# Patient Record
Sex: MALE | Race: WHITE | NOT HISPANIC OR LATINO | Employment: FULL TIME | URBAN - METROPOLITAN AREA
[De-identification: names, ages, dates, MRNs, and addresses within clinical notes are randomized per-mention and may not be internally consistent; named-entity substitution may affect disease eponyms.]

---

## 2017-01-16 DIAGNOSIS — L30.9 DERMATITIS: ICD-10-CM

## 2017-01-16 DIAGNOSIS — L30.4 INTERTRIGO: ICD-10-CM

## 2017-01-16 DIAGNOSIS — L50.9 URTICARIA: ICD-10-CM

## 2017-01-16 DIAGNOSIS — L29.9 ITCHING: Primary | ICD-10-CM

## 2017-01-16 NOTE — TELEPHONE ENCOUNTER
"Miesha-    Spoke to patient as per 11-9-16 dictation, he was supposed to call us in 1 week with an update.     \"I am not having itching at all. I tired going from 2 pills a day, to 1 pill a day and I start breaking out under the arms again..\"     \"I use the Lidex ointment when it breaks out, it clears in a couple days..\"     \"I plan on coming in for a Patch test when I get laid off in a week or two..\"     Please advise. Vania Williamson RN    "

## 2017-01-17 RX ORDER — HYDROXYZINE HYDROCHLORIDE 25 MG/1
25-50 TABLET, FILM COATED ORAL AT BEDTIME
Qty: 60 TABLET | Refills: 1 | Status: SHIPPED | OUTPATIENT
Start: 2017-01-17 | End: 2017-02-16

## 2017-02-14 ENCOUNTER — OFFICE VISIT (OUTPATIENT)
Dept: DERMATOLOGY | Facility: CLINIC | Age: 47
End: 2017-02-14
Payer: COMMERCIAL

## 2017-02-14 VITALS — HEART RATE: 59 BPM | SYSTOLIC BLOOD PRESSURE: 126 MMHG | OXYGEN SATURATION: 99 % | DIASTOLIC BLOOD PRESSURE: 74 MMHG

## 2017-02-14 DIAGNOSIS — L30.9 DERMATITIS: Primary | ICD-10-CM

## 2017-02-14 PROCEDURE — 95044 PATCH/APPLICATION TESTS: CPT | Performed by: PHYSICIAN ASSISTANT

## 2017-02-14 NOTE — MR AVS SNAPSHOT
"              After Visit Summary   2/14/2017    Leobardo Pace    MRN: [id number]           Patient Information     Date Of Birth          1970        Visit Information        Provider Department      2/14/2017 9:20 AM Miesha Callaway PA-C [facility]        Today's Diagnoses     Dermatitis    -  1       Follow-ups after your visit        Your next 10 appointments already scheduled     Feb 16, 2017  9:40 AM CST   Return Visit with Miesha Callaway PA-C   [facility]    [address] [address]   [phone]            Feb 17, 2017 10:20 AM CST   Return Visit with Miesha Callaway PA-C   [facility] ([facility]    [address] [address]   [phone]              Who to contact     If you have questions or need follow up information about today's clinic visit or your schedule please contact [facility] directly at [phone].  Normal or non-critical lab and imaging results will be communicated to you by [employer], letter or phone within 4 business days after the clinic has received the results. If you do not hear from us within 7 days, please contact the clinic through [employer] or phone. If you have a critical or abnormal lab result, we will notify you by phone as soon as possible.  Submit refill requests through [employer] or call your pharmacy and they will forward the refill request to us. Please allow 3 business days for your refill to be completed.          Additional Information About Your Visit        [employer] Information     [employer] lets you send messages to your doctor, view your test results, renew your prescriptions, schedule appointments and more. To sign up, go to [email or web address] . Click on \"Log in\" on the left side of the screen, which will take you to the Welcome page. Then click on \"Sign up Now\" on the right side of the page.     You " will be asked to enter the access code listed below, as well as some personal information. Please follow the directions to create your username and password.     Your access code is: [email or web address]  Expires: 5/15/2017  9:58 AM     Your access code will  in 90 days. If you need help or a new code, please call your [facility] or [phone].        Care EveryWhere ID     This is your Care EveryWhere ID. This could be used by other organizations to access your [place] medical records  [id number]        Your Vitals Were     Pulse Pulse Oximetry                59 99%           Blood Pressure from Last 3 Encounters:   17 126/74   16 140/73   10/28/16 149/82    Weight from Last 3 Encounters:   12 103.6 kg (228 lb 6.4 oz)   12 105.4 kg (232 lb 6.4 oz)              We Performed the Following     PATCH TESTS, EACH        Primary Care Provider    Mason Arthur MD       No address on file        Thank you!     Thank you for choosing [facility]  for your care. Our goal is always to provide you with excellent care. Hearing back from our patients is one way we can continue to improve our services. Please take a few minutes to complete the written survey that you may receive in the mail after your visit with us. Thank you!             Your Updated Medication List - Protect others around you: Learn how to safely use, store and throw away your medicines at www.disposemymeds.org.          This list is accurate as of: 17  9:58 AM.  Always use your most recent med list.                   Brand Name Dispense Instructions for use    BENADRYL cream   Generic drug:  diphenhydrAMINE-zinc acetate      Apply  topically 3 times daily as needed.       fluocinonide 0.05 % ointment    LIDEX    60 g    Apply sparingly to affected area twice daily as needed.  Do not apply to face.       hydrOXYzine 25 MG tablet    ATARAX    60 tablet    Take 1-2 tablets (25-50 mg) by mouth At Bedtime

## 2017-02-14 NOTE — PROGRESS NOTES
Leobardo Pace is a 47 year old year old male patient here today for true testing. Patient reports that he still has recurrent rash on his axilla. He is still taking zyrtec and hydroxyzine and using the topical steroid as needed. Patient has no other skin complaints today.  Remainder of the HPI, Meds, PMH, Allergies, FH, and SH was reviewed in chart.  History reviewed. No pertinent past medical history.    History reviewed. No pertinent past surgical history.     History reviewed. No pertinent family history.    Social History     Social History     Marital status:      Spouse name: N/A     Number of children: N/A     Years of education: N/A     Occupational History     Not on file.     Social History Main Topics     Smoking status: Never Smoker     Smokeless tobacco: Never Used     Alcohol use Not on file     Drug use: Not on file     Sexual activity: Not on file     Other Topics Concern     Not on file     Social History Narrative       Outpatient Encounter Prescriptions as of 2/14/2017   Medication Sig Dispense Refill     hydrOXYzine (ATARAX) 25 MG tablet Take 1-2 tablets (25-50 mg) by mouth At Bedtime 60 tablet 1     fluocinonide (LIDEX) 0.05 % ointment Apply sparingly to affected area twice daily as needed.  Do not apply to face. 60 g 0     diphenhydrAMINE-zinc acetate (BENADRYL) cream Apply  topically 3 times daily as needed.       No facility-administered encounter medications on file as of 2/14/2017.              Review Of Systems  Skin: As above  Eyes: negative  Ears/Nose/Throat: negative  Respiratory: No shortness of breath, dyspnea on exertion, cough, or hemoptysis  Cardiovascular: negative  Gastrointestinal: negative  Genitourinary: negative  Musculoskeletal: negative  Neurologic: negative  Psychiatric: negative  Hematologic/Lymphatic/Immunologic: negative  Endocrine: negative      O:   NAD, WDWN, Alert & Oriented, Mood & Affect wnl, Vitals stable   Here today alone   /74  Pulse 59  SpO2  99%   General appearance normal   Vitals stable   Alert, oriented and in no acute distress      Patches were applied to upper back today      Eyes: Conjunctivae/lids:Normal     MSK:Normal    Pulm: Breathing Normal    Neuro/Psych: Orientation:Normal; Mood/Affect:Normal  A/P:  1. Recurrent Dermatitis with eos.   PATCH TESTING- 36 allergens are tested, including negative control  Your skin condition may be caused by an allergic response to chemicals you come in contact with. The method to obtain proof of a possible contact dermatitis is to apply patch tests. Sticky patches will be applied to your back that contains different common chemicals found in household end work environments. A positive test reaction will look like a red patch and is sometimes itchy.  PATCH TEST PROCEDURES  Patch Testing requires 3 office visits in one week. Test allergens will be taped to your back on (1st visit). The patches will be removed in 48 hours (2nd visit) and you will need to return the next day for the doctor to do the final reading (3rd visit). On rare occasions a fourth check may be needed at 6 days.  Final Visit: If you have positive reactions, practitioner will prepare information handouts on the chemicals you are sensitive to and also generate a list of safe products to use.  PATCH TEST INSTRUCTIONS  1. The test will be applied to the upper butt area. If these areas are hairy they will need to be shaved prior to application.  2. Please notify us if you ere pregnant.   3. To prevent excessive sweating please refrain from exercising, performing hard work, taking a shower or hot bath. You may take e tepid bath. Do not get the patch test area wet.   4. Do not take any cortisone medicine during the test. Call if you have any questions about your medications. You must be off oral cortisone (prednisone) for at least 2 weeks prior to this test.   5. Avoid exposure of the back to the sun.   6. If any patch burns and/or itches severely  call the office for further instructions.   7. Do not remove the patch.   8. Do not rub or scratch the application zone. Itching is an indication of a positive response and scratching might alter the test result.   PLEASE BRING THE FOLLOWING ITEMS ON YOUR FIRST AND LAST VISIT  Please bring with you the following: topical medicines, eye drops, cosmetics, moisturizing creams, after shaves, perfumes, colognes, sunscreens, insect repellents or other products you or your spouse apply to the skin. If you have hand dermatitis please bring any gloves that you wear. Bring these items with you even if you have not used them recently or even if you do not think you have any problems with the item.  If you have any questions contact our office   Tues (Patches applied)  Thur(Patches removed 48 hr. reading)  Fri (72 hour reading by doctor results discussed)

## 2017-02-14 NOTE — NURSING NOTE
Initial /74  Pulse 59  SpO2 99% Estimated body mass index is 30.98 kg/(m^2) as calculated from the following:    Height as of 8/13/12: 1.829 m (6').    Weight as of 8/13/12: 103.6 kg (228 lb 6.4 oz). .

## 2017-02-16 ENCOUNTER — OFFICE VISIT (OUTPATIENT)
Dept: DERMATOLOGY | Facility: CLINIC | Age: 47
End: 2017-02-16
Payer: COMMERCIAL

## 2017-02-16 DIAGNOSIS — L29.9 ITCHING: ICD-10-CM

## 2017-02-16 PROCEDURE — 99024 POSTOP FOLLOW-UP VISIT: CPT | Performed by: PHYSICIAN ASSISTANT

## 2017-02-16 RX ORDER — HYDROXYZINE HYDROCHLORIDE 25 MG/1
25-50 TABLET, FILM COATED ORAL AT BEDTIME
Qty: 60 TABLET | Refills: 2 | Status: SHIPPED | OUTPATIENT
Start: 2017-02-16 | End: 2018-03-29

## 2017-02-16 NOTE — MR AVS SNAPSHOT
"              After Visit Summary   2/16/2017    Leobardo Pace    MRN: [id number]           Patient Information     Date Of Birth          1970        Visit Information        Provider Department      2/16/2017 9:40 AM Miesha Callaway PA-C [facility]        Today's Diagnoses     Itching           Follow-ups after your visit        Your next 10 appointments already scheduled     Feb 17, 2017 10:20 AM CST   Return Visit with Miesha Callaway PA-C   [facility]    [address] [address] [phone]              Who to contact     If you have questions or need follow up information about today's clinic visit or your schedule please contact [facility] directly at [phone].  Normal or non-critical lab and imaging results will be communicated to you by [employer], letter or phone within 4 business days after the clinic has received the results. If you do not hear from us within 7 days, please contact the clinic through MyChart or phone. If you have a critical or abnormal lab result, we will notify you by phone as soon as possible.  Submit refill requests through [email or web address] or call your pharmacy and they will forward the refill request to us. Please allow 3 business days for your refill to be completed.          Additional Information About Your Visit        MyChart Information     [email or web address] lets you send messages to your doctor, view your test results, renew your prescriptions, schedule appointments and more. To sign up, go to [email or web address] . Click on \"Log in\" on the left side of the screen, which will take you to the Welcome page. Then click on \"Sign up Now\" on the right side of the page.     You will be asked to enter the access code listed below, as well as some personal information. Please follow the directions to create your username and password.     Your access code is: [email or web address]  Expires: " 5/15/2017  9:58 AM     Your access code will  in 90 days. If you need help or a new code, please call your [place] clinic or [phone].        Care EveryWhere ID     This is your Care EveryWhere ID. This could be used by other organizations to access your [place] medical records  [id number]         Blood Pressure from Last 3 Encounters:   17 126/74   16 140/73   10/28/16 149/82    Weight from Last 3 Encounters:   12 103.6 kg (228 lb 6.4 oz)   12 105.4 kg (232 lb 6.4 oz)              Today, you had the following     No orders found for display         Where to get your medicines      These medications were sent to [facility] [address] [place] [address]  [address] [address]     Phone:  [phone]     hydrOXYzine 25 MG tablet          Primary Care Provider    None Doctor, MD       No address on file        Thank you!     Thank you for choosing [facility]  for your care. Our goal is always to provide you with excellent care. Hearing back from our patients is one way we can continue to improve our services. Please take a few minutes to complete the written survey that you may receive in the mail after your visit with us. Thank you!             Your Updated Medication List - Protect others around you: Learn how to safely use, store and throw away your medicines at www.disposemymeds.org.          This list is accurate as of: 17 12:36 PM.  Always use your most recent med list.                   Brand Name Dispense Instructions for use    BENADRYL cream   Generic drug:  diphenhydrAMINE-zinc acetate      Apply  topically 3 times daily as needed.       fluocinonide 0.05 % ointment    LIDEX    60 g    Apply sparingly to affected area twice daily as needed.  Do not apply to face.       hydrOXYzine 25 MG tablet    ATARAX    60 tablet    Take 1-2 tablets (25-50 mg) by mouth At Bedtime

## 2017-02-16 NOTE — PROGRESS NOTES
Patient is here today for 48 hours true test reading.   Patches were removed today.  Mildly positive for fragrance mix and gold.   Continue to keep skin dry for next 24 hours.   Recheck in 24 hours.

## 2017-02-17 ENCOUNTER — OFFICE VISIT (OUTPATIENT)
Dept: DERMATOLOGY | Facility: CLINIC | Age: 47
End: 2017-02-17
Payer: COMMERCIAL

## 2017-02-17 VITALS — SYSTOLIC BLOOD PRESSURE: 122 MMHG | DIASTOLIC BLOOD PRESSURE: 72 MMHG | OXYGEN SATURATION: 99 % | HEART RATE: 64 BPM

## 2017-02-17 DIAGNOSIS — L23.89 ALLERGIC CONTACT DERMATITIS DUE TO OTHER AGENTS: Primary | ICD-10-CM

## 2017-02-17 PROCEDURE — 99213 OFFICE O/P EST LOW 20 MIN: CPT | Performed by: PHYSICIAN ASSISTANT

## 2017-02-17 NOTE — MR AVS SNAPSHOT
"              After Visit Summary   2017    Leobardo Pace    MRN: [id number]           Patient Information     Date Of Birth          1970        Visit Information        Provider Department      2017 10:20 AM Miesha Callaway PA-C [facility]        Today's Diagnoses     Allergic contact dermatitis due to other agents    -  1       Follow-ups after your visit        Who to contact     If you have questions or need follow up information about today's clinic visit or your schedule please contact [facility] directly at [phone].  Normal or non-critical lab and imaging results will be communicated to you by [employer], letter or phone within 4 business days after the clinic has received the results. If you do not hear from us within 7 days, please contact the clinic through [employer] or phone. If you have a critical or abnormal lab result, we will notify you by phone as soon as possible.  Submit refill requests through [employer] or call your pharmacy and they will forward the refill request to us. Please allow 3 business days for your refill to be completed.          Additional Information About Your Visit        MyChart Information     [employer] lets you send messages to your doctor, view your test results, renew your prescriptions, schedule appointments and more. To sign up, go to [email or web address] . Click on \"Log in\" on the left side of the screen, which will take you to the Welcome page. Then click on \"Sign up Now\" on the right side of the page.     You will be asked to enter the access code listed below, as well as some personal information. Please follow the directions to create your username and password.     Your access code is: [email or web address]  Expires: 5/15/2017  9:58 AM     Your access code will  in 90 days. If you need help or a new code, please call your [facility] or [phone].        Care EveryWhere ID     This is your Care EveryWhere ID. This " could be used by other organizations to access your [place] medical records  [id number]        Your Vitals Were     Pulse Pulse Oximetry                64 99%           Blood Pressure from Last 3 Encounters:   02/17/17 122/72   02/14/17 126/74   11/09/16 140/73    Weight from Last 3 Encounters:   08/13/12 103.6 kg (228 lb 6.4 oz)   08/02/12 105.4 kg (232 lb 6.4 oz)              Today, you had the following     No orders found for display       Primary Care Provider    Mason Arthur MD       No address on file        Thank you!     Thank you for choosing [facility]  for your care. Our goal is always to provide you with excellent care. Hearing back from our patients is one way we can continue to improve our services. Please take a few minutes to complete the written survey that you may receive in the mail after your visit with us. Thank you!             Your Updated Medication List - Protect others around you: Learn how to safely use, store and throw away your medicines at www.disposemymeds.org.          This list is accurate as of: 2/17/17  1:11 PM.  Always use your most recent med list.                   Brand Name Dispense Instructions for use    BENADRYL cream   Generic drug:  diphenhydrAMINE-zinc acetate      Apply  topically 3 times daily as needed.       fluocinonide 0.05 % ointment    LIDEX    60 g    Apply sparingly to affected area twice daily as needed.  Do not apply to face.       hydrOXYzine 25 MG tablet    ATARAX    60 tablet    Take 1-2 tablets (25-50 mg) by mouth At Bedtime

## 2017-02-17 NOTE — NURSING NOTE
Initial /72 (BP Location: Left arm, Patient Position: Chair, Cuff Size: Adult Large)  Pulse 64  SpO2 99% Estimated body mass index is 30.98 kg/(m^2) as calculated from the following:    Height as of 8/13/12: 1.829 m (6').    Weight as of 8/13/12: 103.6 kg (228 lb 6.4 oz). .    Dafne Dickens LPN

## 2017-02-17 NOTE — PROGRESS NOTES
Leobardo Pace is a 47 year old year old male patient here today for recheck true test.  Patient is here for 72 hour true test reading.  Remainder of the HPI, Meds, PMH, Allergies, FH, and SH was reviewed in chart.  History reviewed. No pertinent past medical history.    History reviewed. No pertinent past surgical history.     History reviewed. No pertinent family history.    Social History     Social History     Marital status:      Spouse name: N/A     Number of children: N/A     Years of education: N/A     Occupational History     Not on file.     Social History Main Topics     Smoking status: Never Smoker     Smokeless tobacco: Never Used     Alcohol use Not on file     Drug use: Not on file     Sexual activity: Not on file     Other Topics Concern     Not on file     Social History Narrative       Outpatient Encounter Prescriptions as of 2/17/2017   Medication Sig Dispense Refill     hydrOXYzine (ATARAX) 25 MG tablet Take 1-2 tablets (25-50 mg) by mouth At Bedtime 60 tablet 2     fluocinonide (LIDEX) 0.05 % ointment Apply sparingly to affected area twice daily as needed.  Do not apply to face. 60 g 0     diphenhydrAMINE-zinc acetate (BENADRYL) cream Apply  topically 3 times daily as needed.       No facility-administered encounter medications on file as of 2/17/2017.              Review Of Systems  Skin: As above  Eyes: negative  Ears/Nose/Throat: negative  Respiratory: No shortness of breath, dyspnea on exertion, cough, or hemoptysis  Cardiovascular: negative  Gastrointestinal: negative  Genitourinary: negative  Musculoskeletal: negative  Neurologic: negative  Psychiatric: negative  Hematologic/Lymphatic/Immunologic: negative  Endocrine: negative      O:   NAD, WDWN, Alert & Oriented, Mood & Affect wnl, Vitals stable   Here today alone   /72 (BP Location: Left arm, Patient Position: Chair, Cuff Size: Adult Large)  Pulse 64  SpO2 99%   General appearance normal   Vitals stable   Alert, oriented and  in no acute distress      Box for fragrance mix has mild erythema with papules      Eyes: Conjunctivae/lids:Normal     ENT: Lips    MSK:Normal    Pulm: Breathing Normal    Neuro/Psych: Orientation:Normal; Mood/Affect:Normal  A/P:  1. Weak positive reaction to fragrance mix   Discussed allergy, allergen information was printed out for patient.   Also printed information of safe products from the [employer] that do not contain allergen.   Discussed avoidance.   Continue antihistamines and topical steroid as needed.   Recheck in 3-6 months if not improving. Skin care regimen reviewed with patient: Eliminate harsh soaps, i.e. Dial, zest, irsih spring; Mild soaps such as Cetaphil or Dove sensitive skin, avoid hot or cold showers, aggressive use of emollients including vanicream, cetaphil or cerave discussed with patient.

## 2018-03-29 ENCOUNTER — OFFICE VISIT (OUTPATIENT)
Dept: DERMATOLOGY | Facility: CLINIC | Age: 48
End: 2018-03-29
Payer: COMMERCIAL

## 2018-03-29 VITALS
DIASTOLIC BLOOD PRESSURE: 71 MMHG | OXYGEN SATURATION: 99 % | HEART RATE: 56 BPM | RESPIRATION RATE: 16 BRPM | SYSTOLIC BLOOD PRESSURE: 121 MMHG

## 2018-03-29 DIAGNOSIS — L29.9 ITCHING: ICD-10-CM

## 2018-03-29 DIAGNOSIS — L30.9 DERMATITIS: ICD-10-CM

## 2018-03-29 PROCEDURE — 99213 OFFICE O/P EST LOW 20 MIN: CPT | Performed by: PHYSICIAN ASSISTANT

## 2018-03-29 RX ORDER — FLUOCINONIDE 0.5 MG/G
OINTMENT TOPICAL
Qty: 60 G | Refills: 1 | Status: SHIPPED | OUTPATIENT
Start: 2018-03-29 | End: 2019-11-18

## 2018-03-29 RX ORDER — HYDROXYZINE HYDROCHLORIDE 25 MG/1
25-50 TABLET, FILM COATED ORAL AT BEDTIME
Qty: 60 TABLET | Refills: 2 | Status: SHIPPED | OUTPATIENT
Start: 2018-03-29 | End: 2019-02-25

## 2018-03-29 NOTE — LETTER
3/29/2018         RE: Leobardo Pace  [address] [place] [address]        Dear Colleague,    Thank you for referring your patient, Leobardo Pace, to the [facility]. Please see a copy of my visit note below.    Leobrado Pace is a 48 year old year old male patient here today for recheck rash on arms. He reports that he will intermittently get rash on arms. His true test was positive for fragrance mix. Patient reports that he still gets rash intermittently. Patient has no other skin complaints today.  Remainder of the HPI, Meds, PMH, Allergies, FH, and SH was reviewed in chart.   History reviewed. No pertinent past medical history.    History reviewed. No pertinent surgical history.     History reviewed. No pertinent family history.    Social History     Social History     Marital status:      Spouse name: N/A     Number of children: N/A     Years of education: N/A     Occupational History     Not on file.     Social History Main Topics     Smoking status: Never Smoker     Smokeless tobacco: Never Used     Alcohol use Not on file     Drug use: Not on file     Sexual activity: Not on file     Other Topics Concern     Not on file     Social History Narrative       Outpatient Encounter Prescriptions as of 3/29/2018   Medication Sig Dispense Refill     hydrOXYzine (ATARAX) 25 MG tablet Take 1-2 tablets (25-50 mg) by mouth At Bedtime 60 tablet 2     fluocinonide (LIDEX) 0.05 % ointment Apply sparingly to affected area twice daily as needed.  Do not apply to face. 60 g 1     diphenhydrAMINE-zinc acetate (BENADRYL) cream Apply  topically 3 times daily as needed.       [DISCONTINUED] hydrOXYzine (ATARAX) 25 MG tablet Take 1-2 tablets (25-50 mg) by mouth At Bedtime 60 tablet 2     [DISCONTINUED] fluocinonide (LIDEX) 0.05 % ointment Apply sparingly to affected area twice daily as needed.  Do not apply to face. 60 g 0     No facility-administered encounter medications on file as of 3/29/2018.               Review Of Systems  Skin: As above  Eyes: negative  Ears/Nose/Throat: negative  Respiratory: No shortness of breath, dyspnea on exertion, cough, or hemoptysis  Cardiovascular: negative  Gastrointestinal: negative  Genitourinary: negative  Musculoskeletal: negative  Neurologic: negative  Psychiatric: negative  Hematologic/Lymphatic/Immunologic: negative  Endocrine: negative      O:   NAD, WDWN, Alert & Oriented, Mood & Affect wnl, Vitals stable   Here today alone   /71 (BP Location: Right arm, Patient Position: Chair, Cuff Size: Adult Large)  Pulse 56  Resp 16  SpO2 99%   General appearance normal   Vitals stable   Alert, oriented and in no acute distress      No visible rash today      Eyes: Conjunctivae/lids:Normal     ENT: Lips: normal    MSK:Normal    Pulm: Breathing Normal    Neuro/Psych: Orientation:Normal; Mood/Affect:Normal  A/P:  1. Dermatitis  Discussed to try Vanicream deodorant.   Use lidex as needed.   Follow skin care.   Skin care regimen reviewed with patient: Eliminate harsh soaps, i.e. Dial, zest, irsih spring; Mild soaps such as Cetaphil or Dove sensitive skin, avoid hot or cold showers, aggressive use of emollients including vanicream, cetaphil or cerave discussed with patient.    Return to clinic as needed.     Again, thank you for allowing me to participate in the care of your patient.        Sincerely,        Miesha Thomas PA-C

## 2018-03-29 NOTE — MR AVS SNAPSHOT
"              After Visit Summary   3/29/2018    Leobardo Pace    MRN: [id number]           Patient Information     Date Of Birth          1970        Visit Information        Provider Department      3/29/2018 10:00 AM Miesha Callaway PA-C [facility]        Today's Diagnoses     Itching        Dermatitis           Follow-ups after your visit        Who to contact     If you have questions or need follow up information about today's clinic visit or your schedule please contact [facility] directly at [phone].  Normal or non-critical lab and imaging results will be communicated to you by [employer], letter or phone within 4 business days after the clinic has received the results. If you do not hear from us within 7 days, please contact the clinic through [employer] or phone. If you have a critical or abnormal lab result, we will notify you by phone as soon as possible.  Submit refill requests through zSoup or call your pharmacy and they will forward the refill request to us. Please allow 3 business days for your refill to be completed.          Additional Information About Your Visit        MyChart Information     zSoup lets you send messages to your doctor, view your test results, renew your prescriptions, schedule appointments and more. To sign up, go to [email or web address] . Click on \"Log in\" on the left side of the screen, which will take you to the Welcome page. Then click on \"Sign up Now\" on the right side of the page.     You will be asked to enter the access code listed below, as well as some personal information. Please follow the directions to create your username and password.     Your access code is: [email or web address]  Expires: 2018  2:04 AM     Your access code will  in 90 days. If you need help or a new code, please call your [facility] or [phone].        Care EveryWhere ID     This is your Care EveryWhere ID. This could be used by other " organizations to access your [facility] medical records  [id number]        Your Vitals Were     Pulse Respirations Pulse Oximetry             56 16 99%          Blood Pressure from Last 3 Encounters:   03/29/18 121/71   02/17/17 122/72   02/14/17 126/74    Weight from Last 3 Encounters:   08/13/12 103.6 kg (228 lb 6.4 oz)   08/02/12 105.4 kg (232 lb 6.4 oz)              Today, you had the following     No orders found for display         Where to get your medicines      These medications were sent to [facility] [place], [place] - [address]  [address] [address]     Phone:  [phone]     fluocinonide 0.05 % ointment    hydrOXYzine 25 MG tablet          Primary Care Provider Fax #    Physician No Ref-Primary [phone]       No address on file        Equal Access to Services     JERMAN MG : Rachelle Singer, wasofia weaver, benson rajanaljanuary benavides, gladys stark . So [facility] [phone].    ATENCIÓN: Si habla español, tiene a velez disposición servicios gratuitos de asistencia lingüística. Dave al [phone].    We comply with applicable federal civil rights laws and [place] laws. We do not discriminate on the basis of race, color, national origin, age, disability, sex, sexual orientation, or gender identity.            Thank you!     Thank you for choosing [facility]  for your care. Our goal is always to provide you with excellent care. Hearing back from our patients is one way we can continue to improve our services. Please take a few minutes to complete the written survey that you may receive in the mail after your visit with us. Thank you!             Your Updated Medication List - Protect others around you: Learn how to safely use, store and throw away your medicines at www.disposemymeds.org.          This list is accurate as of 3/29/18 11:59 PM.  Always use your most recent med list.                   Brand Name  Dispense Instructions for use Diagnosis    BENADRYL cream   Generic drug:  diphenhydrAMINE-zinc acetate      Apply  topically 3 times daily as needed.        fluocinonide 0.05 % ointment    LIDEX    60 g    Apply sparingly to affected area twice daily as needed.  Do not apply to face.    Dermatitis       hydrOXYzine 25 MG tablet    ATARAX    60 tablet    Take 1-2 tablets (25-50 mg) by mouth At Bedtime    Itching

## 2018-03-29 NOTE — NURSING NOTE
Chief Complaint   Patient presents with     Derm Problem     medication refill for dermatitis       Initial /71 (BP Location: Right arm, Patient Position: Chair, Cuff Size: Adult Large)  Pulse 56  Resp 16  SpO2 99% Estimated body mass index is 30.98 kg/(m^2) as calculated from the following:    Height as of 8/13/12: 1.829 m (6').    Weight as of 8/13/12: 103.6 kg (228 lb 6.4 oz).  Medication Reconciliation: complete    Monica GREENBERG, CMA

## 2018-04-02 NOTE — PROGRESS NOTES
Leobardo Pace is a 48 year old year old male patient here today for recheck rash on arms. He reports that he will intermittently get rash on arms. His true test was positive for fragrance mix. Patient reports that he still gets rash intermittently. Patient has no other skin complaints today.  Remainder of the HPI, Meds, PMH, Allergies, FH, and SH was reviewed in chart.   History reviewed. No pertinent past medical history.    History reviewed. No pertinent surgical history.     History reviewed. No pertinent family history.    Social History     Social History     Marital status:      Spouse name: N/A     Number of children: N/A     Years of education: N/A     Occupational History     Not on file.     Social History Main Topics     Smoking status: Never Smoker     Smokeless tobacco: Never Used     Alcohol use Not on file     Drug use: Not on file     Sexual activity: Not on file     Other Topics Concern     Not on file     Social History Narrative       Outpatient Encounter Prescriptions as of 3/29/2018   Medication Sig Dispense Refill     hydrOXYzine (ATARAX) 25 MG tablet Take 1-2 tablets (25-50 mg) by mouth At Bedtime 60 tablet 2     fluocinonide (LIDEX) 0.05 % ointment Apply sparingly to affected area twice daily as needed.  Do not apply to face. 60 g 1     diphenhydrAMINE-zinc acetate (BENADRYL) cream Apply  topically 3 times daily as needed.       [DISCONTINUED] hydrOXYzine (ATARAX) 25 MG tablet Take 1-2 tablets (25-50 mg) by mouth At Bedtime 60 tablet 2     [DISCONTINUED] fluocinonide (LIDEX) 0.05 % ointment Apply sparingly to affected area twice daily as needed.  Do not apply to face. 60 g 0     No facility-administered encounter medications on file as of 3/29/2018.              Review Of Systems  Skin: As above  Eyes: negative  Ears/Nose/Throat: negative  Respiratory: No shortness of breath, dyspnea on exertion, cough, or hemoptysis  Cardiovascular: negative  Gastrointestinal: negative  Genitourinary:  negative  Musculoskeletal: negative  Neurologic: negative  Psychiatric: negative  Hematologic/Lymphatic/Immunologic: negative  Endocrine: negative      O:   NAD, WDWN, Alert & Oriented, Mood & Affect wnl, Vitals stable   Here today alone   /71 (BP Location: Right arm, Patient Position: Chair, Cuff Size: Adult Large)  Pulse 56  Resp 16  SpO2 99%   General appearance normal   Vitals stable   Alert, oriented and in no acute distress      No visible rash today      Eyes: Conjunctivae/lids:Normal     ENT: Lips: normal    MSK:Normal    Pulm: Breathing Normal    Neuro/Psych: Orientation:Normal; Mood/Affect:Normal  A/P:  1. Dermatitis  Discussed to try Vanicream deodorant.   Use lidex as needed.   Follow skin care.   Skin care regimen reviewed with patient: Eliminate harsh soaps, i.e. Dial, zest, irsih spring; Mild soaps such as Cetaphil or Dove sensitive skin, avoid hot or cold showers, aggressive use of emollients including vanicream, cetaphil or cerave discussed with patient.    Return to clinic as needed.

## 2019-01-04 ENCOUNTER — OFFICE VISIT (OUTPATIENT)
Dept: FAMILY MEDICINE | Facility: CLINIC | Age: 49
End: 2019-01-04
Payer: COMMERCIAL

## 2019-01-04 VITALS
HEART RATE: 60 BPM | WEIGHT: 236 LBS | BODY MASS INDEX: 30.29 KG/M2 | SYSTOLIC BLOOD PRESSURE: 108 MMHG | HEIGHT: 74 IN | DIASTOLIC BLOOD PRESSURE: 60 MMHG | TEMPERATURE: 98.1 F

## 2019-01-04 DIAGNOSIS — B30.9 ACUTE VIRAL CONJUNCTIVITIS OF BOTH EYES: Primary | ICD-10-CM

## 2019-01-04 PROCEDURE — 99203 OFFICE O/P NEW LOW 30 MIN: CPT | Performed by: NURSE PRACTITIONER

## 2019-01-04 RX ORDER — POLYMYXIN B SULFATE AND TRIMETHOPRIM 1; 10000 MG/ML; [USP'U]/ML
1-2 SOLUTION OPHTHALMIC 4 TIMES DAILY
Qty: 6 ML | Refills: 0 | Status: SHIPPED | OUTPATIENT
Start: 2019-01-04 | End: 2019-02-25

## 2019-01-04 RX ORDER — POLYMYXIN B SULFATE AND TRIMETHOPRIM 1; 10000 MG/ML; [USP'U]/ML
1-2 SOLUTION OPHTHALMIC EVERY 4 HOURS
Qty: 6 ML | Refills: 0 | Status: SHIPPED | OUTPATIENT
Start: 2019-01-04 | End: 2019-01-04

## 2019-01-04 ASSESSMENT — MIFFLIN-ST. JEOR: SCORE: 2000.24

## 2019-01-04 ASSESSMENT — PAIN SCALES - GENERAL: PAINLEVEL: NO PAIN (0)

## 2019-01-04 NOTE — PATIENT INSTRUCTIONS
Suspect viral/allergic conjunctivitis   Recommend trying visine for 24 hours if not improved fill antibiotic     Patient Education     Viral Conjunctivitis    Viral conjunctivitis (sometimes called pink eye) is a common infection of the eye. It is very contagious. Touching the infected eye, then touching another person passes this infection. It can also be spread from one eye to the other in this same way. The most common symptoms include redness, discharge from the eye, swollen eyelids, and a gritty or scratchy feeling in the eye.  This condition will take about 7 to 10 days to go away. Artificial tears (available without a prescription) are often recommended to moisten and clean the eyes. Antibiotic eye drops often are not recommended because they will not kill the virus. But sometimes they may be prescribed by eye doctors. This is to prevent a second, bacterial infection.  Home care    Apply a towel soaked in cool water to the affected eye 3 to 4 times a day (just before applying medicine to the eye).    It is common to have mucus drainage during the night, causing the eyelids to become crusted by morning. Use a warm, wet cloth to wipe this away.    Wash any cloths used to clean the eye after one use. Don't reuse them.    If antibiotic medicines are prescribed, take them exactly as directed. Don't stop taking them until you are told to.    You may use acetaminophen or ibuprofen to control pain, unless another medicine was prescribed. (Note: If you have chronic liver or kidney disease, or if you have ever had a stomach ulcer or gastrointestinal bleeding, talk with your healthcare provider before using these medicines.) Aspirin should never be used in anyone under 18 years of age who is ill with a fever. It may cause severe liver damage.    Wash your hands before and after touching the affected eye. This helps to prevent spreading the infection to your other eye and to others.    The infected person should avoid  sharing towels, washcloths, and bedding with others. This is to prevent spreading the infection.    This illness is contagious during the first week. Children with this illness should be kept out of day care and school until the redness clears.  Follow-up care  Follow up with your healthcare provider, or as advised.  When to seek medical advice  Call your healthcare provider right away if any of these occur:    Worsening vision    Increasing pain in the eye    Increasing swelling or redness of the eyelid    Redness spreading to the face around the eye    Large amount of green or yellow drainage from the eye    Severe itching in or around the eye    Fever of 100.4 F (38 C) or higher  Date Last Reviewed: 7/1/2017 2000-2018 [employer]. [address] [place] [address], [place] [address]. All rights reserved. This information is not intended as a substitute for professional medical care. Always follow your healthcare professional's instructions.           Patient Education     What Is Conjunctivitis?    Conjunctivitis is an irritation or infection. It affects the membrane that covers the white of your eye and the inside of your eyelid (conjunctiva). It can happen to one or both eyes. The membrane swells and the blood vessels enlarge (dilate). This makes your eye red. That's why conjunctivitis is sometimes called red eye or pink eye.  What are the symptoms?  If you have one or more of these symptoms, see an eye healthcare provider:    Redness in and around your eye    Eyes that are puffy and sore    Itching, burning, or stinging eyes    Watery eyes or discharge from your eye    Eyelids that are crusty or stuck together when you wake up in the morning    Pink color in the whites of one or both eyes    Sensitivity to bright light  Getting treatment quickly can help prevent damage to your eyes.  How is it diagnosed?  Conjunctivitis is usually a minor eye infection. But it can sometimes become a more serious problem.  Some more serious eye diseases have symptoms that look like conjunctivitis. So it's important for an eye healthcare provider to diagnose you. Your eye healthcare provider will ask about your symptoms and any medicines you take. He or she will ask about any illnesses or medical conditions you may have. The healthcare provider will also check your eyes with a hand-held light and a special microscope called a slit lamp.  Date Last Reviewed: 10/1/2017    7249-1870 [employer]. [address] [place] [address], [place] [id number]. All rights reserved. This information is not intended as a substitute for professional medical care. Always follow your healthcare professional's instructions.

## 2019-01-04 NOTE — PROGRESS NOTES
"  SUBJECTIVE:   Leobardo Pace is a 48 year old male who presents to clinic today for the following health issues:      Eye(s) Problem      Duration: 1 day    Description:  Location: right eye last night this am was left eye  Pain: no  Redness: YES  Discharge: YES- little bit of crust this am    Accompanying signs and symptoms: itchy    History (Trauma, foreign body exposure,): None    Precipitating or alleviating factors (contact use): None    Therapies tried and outcome: None         Problem list and histories reviewed & adjusted, as indicated.  Additional history: as documented    There is no problem list on file for this patient.    No past surgical history on file.    Social History     Tobacco Use     Smoking status: Never Smoker     Smokeless tobacco: Never Used   Substance Use Topics     Alcohol use: Not on file     No family history on file.        Reviewed and updated as needed this visit by clinical staff  Tobacco  Allergies  Meds       Reviewed and updated as needed this visit by Provider         ROS:  Constitutional, HEENT, cardiovascular, pulmonary, gi and gu systems are negative, except as otherwise noted.    OBJECTIVE:                                                    /60   Pulse 60   Temp 98.1  F (36.7  C) (Tympanic)   Ht 1.88 m (6' 2\")   Wt 107 kg (236 lb)   BMI 30.30 kg/m    Body mass index is 30.3 kg/m .  GENERAL APPEARANCE: healthy, alert and no distress  EYES: Eyes grossly normal to inspection, PERRL and conjunctiva/corneas- conjunctival injection left eye very mild, mild crusting noted   HENT: ear canals and TM's normal and nose and mouth without ulcers or lesions  RESP: lungs clear to auscultation - no rales, rhonchi or wheezes  CV: regular rates and rhythm, normal S1 S2, no S3 or S4 and no murmur, click or rub    Diagnostic test results:  Diagnostic Test Results:  none      ASSESSMENT/PLAN:                                                    1. Acute viral conjunctivitis of both " eyes    Suspect viral/allergic conjunctivitis   Recommend trying visine for 24 hours if not improved fill antibiotic   - trimethoprim-polymyxin b (POLYTRIM) 88375-9.1 UNIT/ML-% ophthalmic solution; Place 1-2 drops into both eyes 4 times daily for 10 days  Dispense: 6 mL; Refill: 0    Patient Education     Viral Conjunctivitis    Viral conjunctivitis (sometimes called pink eye) is a common infection of the eye. It is very contagious. Touching the infected eye, then touching another person passes this infection. It can also be spread from one eye to the other in this same way. The most common symptoms include redness, discharge from the eye, swollen eyelids, and a gritty or scratchy feeling in the eye.  This condition will take about 7 to 10 days to go away. Artificial tears (available without a prescription) are often recommended to moisten and clean the eyes. Antibiotic eye drops often are not recommended because they will not kill the virus. But sometimes they may be prescribed by eye doctors. This is to prevent a second, bacterial infection.  Home care    Apply a towel soaked in cool water to the affected eye 3 to 4 times a day (just before applying medicine to the eye).    It is common to have mucus drainage during the night, causing the eyelids to become crusted by morning. Use a warm, wet cloth to wipe this away.    Wash any cloths used to clean the eye after one use. Don't reuse them.    If antibiotic medicines are prescribed, take them exactly as directed. Don't stop taking them until you are told to.    You may use acetaminophen or ibuprofen to control pain, unless another medicine was prescribed. (Note: If you have chronic liver or kidney disease, or if you have ever had a stomach ulcer or gastrointestinal bleeding, talk with your healthcare provider before using these medicines.) Aspirin should never be used in anyone under 18 years of age who is ill with a fever. It may cause severe liver damage.    Wash  your hands before and after touching the affected eye. This helps to prevent spreading the infection to your other eye and to others.    The infected person should avoid sharing towels, washcloths, and bedding with others. This is to prevent spreading the infection.    This illness is contagious during the first week. Children with this illness should be kept out of day care and school until the redness clears.  Follow-up care  Follow up with your healthcare provider, or as advised.  When to seek medical advice  Call your healthcare provider right away if any of these occur:    Worsening vision    Increasing pain in the eye    Increasing swelling or redness of the eyelid    Redness spreading to the face around the eye    Large amount of green or yellow drainage from the eye    Severe itching in or around the eye    Fever of 100.4 F (38 C) or higher  Date Last Reviewed: 7/1/2017 2000-2018 [employer]. [address] [place] [address]. All rights reserved. This information is not intended as a substitute for professional medical care. Always follow your healthcare professional's instructions.           Patient Education     What Is Conjunctivitis?    Conjunctivitis is an irritation or infection. It affects the membrane that covers the white of your eye and the inside of your eyelid (conjunctiva). It can happen to one or both eyes. The membrane swells and the blood vessels enlarge (dilate). This makes your eye red. That's why conjunctivitis is sometimes called red eye or pink eye.  What are the symptoms?  If you have one or more of these symptoms, see an eye healthcare provider:    Redness in and around your eye    Eyes that are puffy and sore    Itching, burning, or stinging eyes    Watery eyes or discharge from your eye    Eyelids that are crusty or stuck together when you wake up in the morning    Pink color in the whites of one or both eyes    Sensitivity to bright light  Getting treatment  quickly can help prevent damage to your eyes.  How is it diagnosed?  Conjunctivitis is usually a minor eye infection. But it can sometimes become a more serious problem. Some more serious eye diseases have symptoms that look like conjunctivitis. So it's important for an eye healthcare provider to diagnose you. Your eye healthcare provider will ask about your symptoms and any medicines you take. He or she will ask about any illnesses or medical conditions you may have. The healthcare provider will also check your eyes with a hand-held light and a special microscope called a slit lamp.  Date Last Reviewed: 10/1/2017    7714-2399 [employer]. [address] [place] [address], [place] [address]. All rights reserved. This information is not intended as a substitute for professional medical care. Always follow your healthcare professional's instructions.               Chiqui Hurtado NP  [facility]

## 2019-01-04 NOTE — NURSING NOTE
"Chief Complaint   Patient presents with     Eye Problem     possible pink eye x 1 day       Initial /60   Pulse 60   Temp 98.1  F (36.7  C) (Tympanic)   Ht 1.88 m (6' 2\")   Wt 107 kg (236 lb)   BMI 30.30 kg/m   Estimated body mass index is 30.3 kg/m  as calculated from the following:    Height as of this encounter: 1.88 m (6' 2\").    Weight as of this encounter: 107 kg (236 lb).    Patient presents to the clinic using No DME    Health Maintenance that is potentially due pending provider review:  Colonoscopy/FIT    n/a    Is there anyone who you would like to be able to receive your results? No  If yes have patient fill out LYNNE      "

## 2019-02-25 ENCOUNTER — OFFICE VISIT (OUTPATIENT)
Dept: FAMILY MEDICINE | Facility: CLINIC | Age: 49
End: 2019-02-25
Payer: COMMERCIAL

## 2019-02-25 VITALS
RESPIRATION RATE: 18 BRPM | SYSTOLIC BLOOD PRESSURE: 120 MMHG | HEART RATE: 68 BPM | WEIGHT: 230 LBS | TEMPERATURE: 97.8 F | HEIGHT: 74 IN | BODY MASS INDEX: 29.52 KG/M2 | DIASTOLIC BLOOD PRESSURE: 62 MMHG

## 2019-02-25 DIAGNOSIS — H65.02 ACUTE SEROUS OTITIS MEDIA OF LEFT EAR, RECURRENCE NOT SPECIFIED: Primary | ICD-10-CM

## 2019-02-25 PROCEDURE — 99213 OFFICE O/P EST LOW 20 MIN: CPT | Performed by: FAMILY MEDICINE

## 2019-02-25 RX ORDER — AMOXICILLIN 500 MG/1
500 CAPSULE ORAL 3 TIMES DAILY
Qty: 30 CAPSULE | Refills: 0 | Status: SHIPPED | OUTPATIENT
Start: 2019-02-25 | End: 2019-07-05

## 2019-02-25 SDOH — HEALTH STABILITY: MENTAL HEALTH: HOW OFTEN DO YOU HAVE A DRINK CONTAINING ALCOHOL?: NEVER

## 2019-02-25 ASSESSMENT — MIFFLIN-ST. JEOR: SCORE: 1968.02

## 2019-02-25 NOTE — PROGRESS NOTES
"  SUBJECTIVE:   Leobardo Pace is a 49 year old male who presents to clinic today for the following health issues:      Ear       Duration: about  Week     Description (location/character/radiation): Left ear     Intensity:  moderate    Accompanying signs and symptoms: Feels plugged- sometimes will throb     History (similar episodes/previous evaluation): None    Precipitating or alleviating factors: None    Therapies tried and outcome: wax treatment at home         Problem list and histories reviewed & adjusted, as indicated.  Additional history: as documented    There is no problem list on file for this patient.    History reviewed. No pertinent surgical history.    Social History     Tobacco Use     Smoking status: Never Smoker     Smokeless tobacco: Never Used   Substance Use Topics     Alcohol use: No     Frequency: Never     History reviewed. No pertinent family history.      Current Outpatient Medications   Medication Sig Dispense Refill     fluocinonide (LIDEX) 0.05 % ointment Apply sparingly to affected area twice daily as needed.  Do not apply to face. 60 g 1     Allergies   Allergen Reactions     Fluconazole      Urticaria after 1st dosage.      No lab results found.   BP Readings from Last 3 Encounters:   02/25/19 120/62   01/04/19 108/60   03/29/18 121/71    Wt Readings from Last 3 Encounters:   02/25/19 104.3 kg (230 lb)   01/04/19 107 kg (236 lb)   08/13/12 103.6 kg (228 lb 6.4 oz)                  Labs reviewed in EPIC    Reviewed and updated as needed this visit by clinical staff       Reviewed and updated as needed this visit by Provider         ROS:  Constitutional, HEENT, cardiovascular, pulmonary, gi and gu systems are negative, except as otherwise noted.    OBJECTIVE:     /62 (Cuff Size: Adult Large)   Pulse 68   Temp 97.8  F (36.6  C) (Tympanic)   Resp 18   Ht 1.88 m (6' 2\")   Wt 104.3 kg (230 lb)   BMI 29.53 kg/m    Body mass index is 29.53 kg/m .  GENERAL: alert and no " distress  EYES: Eyes grossly normal to inspection, PERRL and conjunctivae and sclerae normal  HENT: normal cephalic/atraumatic, right ear: partially erythematous and clear effusion, left ear: normal: no effusions, no erythema, normal landmarks, nose and mouth without ulcers or lesions, oropharynx clear and oral mucous membranes moist, navarrete lateralizing to left ear  NECK: no adenopathy, no asymmetry, masses, or scars and thyroid normal to palpation  RESP: lungs clear to auscultation - no rales, rhonchi or wheezes  NEURO: Normal strength and tone, mentation intact and speech normal      ASSESSMENT/PLAN:     (H65.02) Acute serous otitis media of left ear, recurrence not specified  (primary encounter diagnosis)  Comment: Suspect symptoms secondary to acute serous otitis media.  Treatment options discussed.  Suggested amoxicillin, Flonase, warm fluids and well hydration.  Follow-up if symptoms persist or worsen.  All questions answered.  Plan: amoxicillin (AMOXIL) 500 MG capsule        Yemi Quinn MD  [facility]

## 2019-02-25 NOTE — NURSING NOTE
"Chief Complaint   Patient presents with     Ear Problem       Initial /62 (Cuff Size: Adult Large)   Pulse 68   Temp 97.8  F (36.6  C) (Tympanic)   Resp 18   Ht 1.88 m (6' 2\")   Wt 104.3 kg (230 lb)   BMI 29.53 kg/m   Estimated body mass index is 29.53 kg/m  as calculated from the following:    Height as of this encounter: 1.88 m (6' 2\").    Weight as of this encounter: 104.3 kg (230 lb).        "

## 2019-07-03 NOTE — PROGRESS NOTES
Subjective     Leobardo Pace is a 49 year old male who presents to clinic today for the following health issues: rash on bilateral calves, states very itchy, spreading, started 3 days ago.     Chief Complaint   Patient presents with     Derm Problem     Symptoms after 7/2/19, does not recall coming in contact with anything. States rash on bilateral calves. Has tried calamine lotion to little effect. No new medications, lotions, soaps, detergents, or travel.     Reviewed and updated as needed this visit by Provider         Review of Systems   ROS COMP: Constitutional, HEENT, cardiovascular, pulmonary, gi and gu systems are negative, except as otherwise noted.      Objective    /82   Pulse 66   Temp 97.2  F (36.2  C) (Tympanic)   Resp 12   Wt 104.3 kg (230 lb)   SpO2 98%   BMI 29.53 kg/m    Body mass index is 29.53 kg/m .  Physical Exam   GENERAL: healthy, alert and no distress  SKIN: erythematous rash with sandpaper papules on bilateral calves     Diagnostic Test Results:  Labs reviewed in Epic        Assessment & Plan     1. Irritant contact dermatitis, unspecified trigger    - clobetasol (TEMOVATE) 0.05 % external cream; Apply topically 2 times daily  Dispense: 60 g; Refill: 0  - predniSONE (DELTASONE) 20 MG tablet; Take 2 tablets (40 mg) by mouth daily for 5 days  Dispense: 10 tablet; Refill: 0  - DERMATOLOGY REFERRAL       See Patient Instructions    Return in about 1 week (around 7/12/2019), or if symptoms worsen or fail to improve.    CARMEN Hernandez [facility]

## 2019-07-05 ENCOUNTER — OFFICE VISIT (OUTPATIENT)
Dept: FAMILY MEDICINE | Facility: CLINIC | Age: 49
End: 2019-07-05
Payer: COMMERCIAL

## 2019-07-05 VITALS
WEIGHT: 230 LBS | TEMPERATURE: 97.2 F | OXYGEN SATURATION: 98 % | RESPIRATION RATE: 12 BRPM | HEART RATE: 66 BPM | DIASTOLIC BLOOD PRESSURE: 82 MMHG | BODY MASS INDEX: 29.53 KG/M2 | SYSTOLIC BLOOD PRESSURE: 128 MMHG

## 2019-07-05 DIAGNOSIS — L24.9 IRRITANT CONTACT DERMATITIS, UNSPECIFIED TRIGGER: Primary | ICD-10-CM

## 2019-07-05 PROCEDURE — 99213 OFFICE O/P EST LOW 20 MIN: CPT | Performed by: NURSE PRACTITIONER

## 2019-07-05 RX ORDER — PREDNISONE 20 MG/1
40 TABLET ORAL DAILY
Qty: 10 TABLET | Refills: 0 | Status: SHIPPED | OUTPATIENT
Start: 2019-07-05 | End: 2019-10-25

## 2019-07-05 RX ORDER — CLOBETASOL PROPIONATE 0.5 MG/G
CREAM TOPICAL 2 TIMES DAILY
Qty: 60 G | Refills: 0 | Status: SHIPPED | OUTPATIENT
Start: 2019-07-05 | End: 2019-11-18

## 2019-07-05 NOTE — PATIENT INSTRUCTIONS
Contact dermatitis:    Prednisone 40 mg daily morning  Clobetasol cream twice daily for up to 2 weeks   Benadryl 12.5 mg daily at bedtime as needed for itchiness

## 2019-07-05 NOTE — NURSING NOTE
"Initial /82   Pulse 66   Temp 97.2  F (36.2  C) (Tympanic)   Resp 12   Wt 104.3 kg (230 lb)   SpO2 98%   BMI 29.53 kg/m   Estimated body mass index is 29.53 kg/m  as calculated from the following:    Height as of 2/25/19: 1.88 m (6' 2\").    Weight as of this encounter: 104.3 kg (230 lb). .      "

## 2019-07-22 ENCOUNTER — TELEPHONE (OUTPATIENT)
Dept: FAMILY MEDICINE | Facility: CLINIC | Age: 49
End: 2019-07-22

## 2019-07-22 NOTE — TELEPHONE ENCOUNTER
Panel Management Review      Patient has the following on his problem list: None      Composite cancer screening  Chart review shows that this patient is due/due soon for the following Colonoscopy  Summary:    Patient is due/failing the following:   COLONOSCOPY    Action needed:   Patient needs referral/order: colon screening    Type of outreach:    Sent letter.    Questions for provider review:    None                                                                                                                                    Karla Daniels MA

## 2019-07-22 NOTE — LETTER
[facility]  [address] [facility] [address]  [phone]       July 22, 2019    Leobardo Pace  [address] [place] [address]    Dear Leobardo,    We care about your health and have reviewed your health plan and are making recommendations based on this review, to optimize your health.    You are in particular need of attention regarding:  -Colon Cancer Screening  -Wellness (Physical) Visit     We are recommending that you:                                              -schedule a WELLNESS (Physical) APPOINTMENT. We will check fasting labs the same day. You should have nothing to eat except water and meds for 8-10 hours prior.                                                                                                                                            -schedule a COLONOSCOPY to look for colon cancer (due every 10 years or 5 years in higher risk situations.)        Colon cancer is now the second leading cause of cancer-related deaths in the [place] for both men and women and there are over 130,000 new cases and 50,000 deaths per year from colon cancer.  Colonoscopies can prevent 90-95% of these deaths.  Problem lesions can be removed before they ever become cancer.  This test is not only looking for cancer, but also getting rid of precancerious lesions.    If you are under/uninsured, we recommend you contact the [employer] program. [employer] is a free colorectal cancer screening program that provides colonoscopies for eligible under/uninsured [place] men and women. If you are interested in receiving a free colonoscopy, please call [employer] at [phone] (mention code [employer]) to see if you re eligible.     If you do not wish to do a colonoscopy or cannot afford to do one, at this time, there is another option. It is called a FIT test or Fecal Immunochemical Occult Blood Test (take home stool sample kit).  It does not replace the colonoscopy for  colorectal cancer screening, but it can detect hidden bleeding in the lower colon.  It does need to be repeated every year and if a positive result is obtained, you would be referred for a colonoscopy.       If you have completed either one of these tests at another facility, please call with the details of when and where the tests were done and if they were normal or not. Or have the records sent to our clinic so that we can best coordinate your care.                                                                                                                                                   In addition, here is a list of due or overdue Health Maintenance reminders.    Health Maintenance Due   Topic Date Due     Preventive Care Visit  1970     Colonscopy  01/30/1978     HIV Screening  01/30/1983     Diptheria Tetanus Pertussis (DTAP/TDAP/TD) Vaccine (1 - Tdap) 01/30/1993     Cholesterol Lab  01/30/2003     Zoster (Shingles) Vaccine (1 of 2) 01/30/2018     PHQ-2  01/01/2019       To address the above recommendations, we encourage you to contact us at [phone], via [employer] or by contacting Central Scheduling toll free at [phone] 24 hours a day. They will assist you with finding the most convenient time and location..    Thank you for trusting [facility] and we appreciate the opportunity to serve you.  We look forward to supporting your healthcare needs in the future.    Healthy Regards,    Your [facility] Team/ashley

## 2019-10-21 ENCOUNTER — OFFICE VISIT (OUTPATIENT)
Dept: FAMILY MEDICINE | Facility: CLINIC | Age: 49
End: 2019-10-21
Payer: COMMERCIAL

## 2019-10-21 VITALS
BODY MASS INDEX: 31.29 KG/M2 | TEMPERATURE: 97.4 F | RESPIRATION RATE: 16 BRPM | DIASTOLIC BLOOD PRESSURE: 62 MMHG | OXYGEN SATURATION: 99 % | HEART RATE: 63 BPM | WEIGHT: 243.8 LBS | HEIGHT: 74 IN | SYSTOLIC BLOOD PRESSURE: 128 MMHG

## 2019-10-21 DIAGNOSIS — R22.1 NECK MASS: Primary | ICD-10-CM

## 2019-10-21 LAB
BASOPHILS # BLD AUTO: 0 10E9/L (ref 0–0.2)
BASOPHILS NFR BLD AUTO: 0.6 %
DIFFERENTIAL METHOD BLD: NORMAL
EOSINOPHIL # BLD AUTO: 0.1 10E9/L (ref 0–0.7)
EOSINOPHIL NFR BLD AUTO: 1.5 %
ERYTHROCYTE [DISTWIDTH] IN BLOOD BY AUTOMATED COUNT: 12.9 % (ref 10–15)
HCT VFR BLD AUTO: 43.2 % (ref 40–53)
HGB BLD-MCNC: 14.4 G/DL (ref 13.3–17.7)
LYMPHOCYTES # BLD AUTO: 1.6 10E9/L (ref 0.8–5.3)
LYMPHOCYTES NFR BLD AUTO: 22.8 %
MCH RBC QN AUTO: 30.7 PG (ref 26.5–33)
MCHC RBC AUTO-ENTMCNC: 33.3 G/DL (ref 31.5–36.5)
MCV RBC AUTO: 92 FL (ref 78–100)
MONOCYTES # BLD AUTO: 0.6 10E9/L (ref 0–1.3)
MONOCYTES NFR BLD AUTO: 8.9 %
NEUTROPHILS # BLD AUTO: 4.8 10E9/L (ref 1.6–8.3)
NEUTROPHILS NFR BLD AUTO: 66.2 %
PLATELET # BLD AUTO: 256 10E9/L (ref 150–450)
RBC # BLD AUTO: 4.69 10E12/L (ref 4.4–5.9)
WBC # BLD AUTO: 7.2 10E9/L (ref 4–11)

## 2019-10-21 PROCEDURE — 99214 OFFICE O/P EST MOD 30 MIN: CPT | Performed by: PHYSICIAN ASSISTANT

## 2019-10-21 PROCEDURE — 85025 COMPLETE CBC W/AUTO DIFF WBC: CPT | Performed by: PHYSICIAN ASSISTANT

## 2019-10-21 PROCEDURE — 36415 COLL VENOUS BLD VENIPUNCTURE: CPT | Performed by: PHYSICIAN ASSISTANT

## 2019-10-21 ASSESSMENT — ENCOUNTER SYMPTOMS
EYE DISCHARGE: 0
COUGH: 0
MYALGIAS: 0
FEVER: 0
HEADACHES: 0
SORE THROAT: 0
ABDOMINAL PAIN: 0
NAUSEA: 0
DIARRHEA: 0
WHEEZING: 0
PALPITATIONS: 0
CHILLS: 0
BLURRED VISION: 0
EYE REDNESS: 0
VOMITING: 0
SHORTNESS OF BREATH: 0

## 2019-10-21 ASSESSMENT — MIFFLIN-ST. JEOR: SCORE: 2030.62

## 2019-10-21 NOTE — PROGRESS NOTES
Subjective     Leobardo Pace is a 49 year old male who presents to clinic today for the following health issues:    HPI   Lump on left side by jaw under ear      Duration: 2 months or longer    Description (location/character/radiation): Lump doesn't change, not painful.    Intensity:  None    Accompanying signs and symptoms: Nothing    History (similar episodes/previous evaluation): None    Precipitating or alleviating factors: None    Therapies tried and outcome: None         There is no problem list on file for this patient.    History reviewed. No pertinent surgical history.    Social History     Tobacco Use     Smoking status: Never Smoker     Smokeless tobacco: Never Used   Substance Use Topics     Alcohol use: No     Frequency: Never     History reviewed. No pertinent family history.      Current Outpatient Medications   Medication Sig Dispense Refill     clobetasol (TEMOVATE) 0.05 % external cream Apply topically 2 times daily (Patient not taking: Reported on 10/21/2019) 60 g 0     fluocinonide (LIDEX) 0.05 % ointment Apply sparingly to affected area twice daily as needed.  Do not apply to face. (Patient not taking: Reported on 7/5/2019) 60 g 1     Allergies   Allergen Reactions     Fluconazole      Urticaria after 1st dosage.        Reviewed and updated as needed this visit by Provider  Tobacco  Allergies  Meds  Problems  Med Hx  Surg Hx  Fam Hx           Review of Systems   Constitutional: Negative for chills, fever and malaise/fatigue.   HENT: Negative for congestion, ear pain and sore throat.         Mass on left side of neck   Eyes: Negative for blurred vision, discharge and redness.   Respiratory: Negative for cough, shortness of breath and wheezing.    Cardiovascular: Negative for chest pain and palpitations.   Gastrointestinal: Negative for abdominal pain, diarrhea, nausea and vomiting.   Musculoskeletal: Negative for joint pain and myalgias.   Skin: Negative for rash.   Neurological: Negative  "for headaches.       Objective    /62 (BP Location: Right arm, Patient Position: Sitting, Cuff Size: Adult Large)   Pulse 63   Temp 97.4  F (36.3  C) (Tympanic)   Resp 16   Ht 1.88 m (6' 2\")   Wt 110.6 kg (243 lb 12.8 oz)   SpO2 99%   BMI 31.30 kg/m    Body mass index is 31.3 kg/m .    Physical Exam  Constitutional:       General: He is not in acute distress.     Appearance: He is well-developed.   HENT:      Head: Normocephalic and atraumatic.        Comments: Non-tender mass on left side of neck.      Right Ear: Tympanic membrane and ear canal normal.      Left Ear: Tympanic membrane and ear canal normal.   Eyes:      Conjunctiva/sclera: Conjunctivae normal.      Pupils: Pupils are equal, round, and reactive to light.   Cardiovascular:      Rate and Rhythm: Normal rate and regular rhythm.   Pulmonary:      Effort: Pulmonary effort is normal.      Breath sounds: Normal breath sounds.   Skin:     General: Skin is warm and dry.      Findings: No rash.   Psychiatric:         Behavior: Behavior normal.           Diagnostic Test Results:  CBC- within normal limits   Neck US- pending         Assessment & Plan     1. Neck mass  CBC is within normal limits. Will have patient schedule neck US and will contact him with results.     - US Head Neck Soft Tissue; Future  - CBC with platelets differential       Meghna Santillan PA-C  [facility]    "

## 2019-10-21 NOTE — NURSING NOTE
"Chief Complaint   Patient presents with     Lump     2 months or longer has a lump under the left ear.       Initial /62 (BP Location: Right arm, Patient Position: Sitting, Cuff Size: Adult Large)   Pulse 63   Temp 97.4  F (36.3  C) (Tympanic)   Resp 16   Ht 1.88 m (6' 2\")   Wt 110.6 kg (243 lb 12.8 oz)   SpO2 99%   BMI 31.30 kg/m   Estimated body mass index is 31.3 kg/m  as calculated from the following:    Height as of this encounter: 1.88 m (6' 2\").    Weight as of this encounter: 110.6 kg (243 lb 12.8 oz).    Patient presents to the clinic using No DME    Health Maintenance that is potentially due pending provider review:  Colonoscopy/FIT    Pt will schedule Colonoscopy appt.    Is there anyone who you would like to be able to receive your results? No  If yes have patient fill out LYNNE    Angelica Boyd MA    "

## 2019-10-22 ENCOUNTER — TELEPHONE (OUTPATIENT)
Dept: FAMILY MEDICINE | Facility: CLINIC | Age: 49
End: 2019-10-22

## 2019-10-22 ENCOUNTER — HOSPITAL ENCOUNTER (OUTPATIENT)
Dept: ULTRASOUND IMAGING | Facility: CLINIC | Age: 49
Discharge: HOME | End: 2019-10-22
Attending: PHYSICIAN ASSISTANT | Admitting: PHYSICIAN ASSISTANT
Payer: COMMERCIAL

## 2019-10-22 DIAGNOSIS — R59.9 ENLARGED LYMPH NODES: Primary | ICD-10-CM

## 2019-10-22 DIAGNOSIS — R22.1 NECK MASS: ICD-10-CM

## 2019-10-22 PROCEDURE — 76536 US EXAM OF HEAD AND NECK: CPT

## 2019-10-22 NOTE — TELEPHONE ENCOUNTER
Discussed US results with patient. Will have him schedule neck CT and follow up with ENT. He agrees with plan.     Meghna Santillan PA-C

## 2019-10-24 ENCOUNTER — HOSPITAL ENCOUNTER (OUTPATIENT)
Dept: CT IMAGING | Facility: CLINIC | Age: 49
Discharge: HOME | End: 2019-10-24
Attending: PHYSICIAN ASSISTANT | Admitting: PHYSICIAN ASSISTANT
Payer: COMMERCIAL

## 2019-10-24 DIAGNOSIS — R59.9 ENLARGED LYMPH NODES: ICD-10-CM

## 2019-10-24 PROCEDURE — 25000128 H RX IP 250 OP 636: Performed by: RADIOLOGY

## 2019-10-24 PROCEDURE — 70491 CT SOFT TISSUE NECK W/DYE: CPT

## 2019-10-24 PROCEDURE — 25000125 ZZHC RX 250: Performed by: RADIOLOGY

## 2019-10-24 RX ORDER — IOPAMIDOL 755 MG/ML
80 INJECTION, SOLUTION INTRAVASCULAR ONCE
Status: COMPLETED | OUTPATIENT
Start: 2019-10-24 | End: 2019-10-24

## 2019-10-24 RX ADMIN — IOPAMIDOL 80 ML: 755 INJECTION, SOLUTION INTRAVENOUS at 07:41

## 2019-10-24 RX ADMIN — SODIUM CHLORIDE 80 ML: 9 INJECTION, SOLUTION INTRAVENOUS at 07:42

## 2019-10-24 NOTE — PROGRESS NOTES
Chief Complaint   Patient presents with     Ent Problem     enlarged lymph nodes     History of Present Illness   Leobardo Pace is a 49 year old male who presents today for evaluation.  I am seeing this patient in consultation for lymphadenopathy at the request of the provider Meghna Santillan PA-C.  The patient reports a roughly 2-month history of a left neck mass.  The patient notes no tenderness or pain. No changes in size recently, but it maybe slowly growing with time. The patient denies any facial numbness, weakness, history of skin cancer or any cancer. No dysphagia, odynphagia, or hoarseness. The mass does not change with eating. No obvious signs of infection including fluctuating size, redness, or purulent drainage. No radiation exposure. The patient is a lifetime non-smoker.     Patient underwent an ultrasound of the left neck on 10/22/2019.  My review of the neck ultrasound does show 3 hypoechoic left neck masses, two of which were overlying the sternocleidal mastoid muscle.  The largest areas 3 centers in greatest dimension.      The patient then underwent a CT scan of his neck with contrast on 10/24/2019.  My review of the neck CT reveals 3 lymph nodes lateral to the sternocleidomastoid muscle, one might be in the tail of the parotid.  The largest measures roughly 2.7 cm.  He also has a large left neck level IB/IIA lymph node measuring nearly 2.8 cm in greatest dimension.  He also has an enlarged level 4 lymph node on the left.    Past Medical History  There is no problem list on file for this patient.    Current Medications     Current Outpatient Medications:      clobetasol (TEMOVATE) 0.05 % external cream, Apply topically 2 times daily (Patient not taking: Reported on 10/21/2019), Disp: 60 g, Rfl: 0     fluocinonide (LIDEX) 0.05 % ointment, Apply sparingly to affected area twice daily as needed.  Do not apply to face. (Patient not taking: Reported on 7/5/2019), Disp: 60 g, Rfl: 1    Allergies  Allergies  "  Allergen Reactions     Fluconazole      Urticaria after 1st dosage.        Social History   Social History     Socioeconomic History     Marital status:      Spouse name: Not on file     Number of children: Not on file     Years of education: Not on file     Highest education level: Not on file   Occupational History     Not on file   Social Needs     Financial resource strain: Not on file     Food insecurity:     Worry: Not on file     Inability: Not on file     Transportation needs:     Medical: Not on file     Non-medical: Not on file   Tobacco Use     Smoking status: Never Smoker     Smokeless tobacco: Never Used   Substance and Sexual Activity     Alcohol use: No     Frequency: Never     Drug use: No     Sexual activity: Never   Lifestyle     Physical activity:     Days per week: Not on file     Minutes per session: Not on file     Stress: Not on file   Relationships     Social connections:     Talks on phone: Not on file     Gets together: Not on file     Attends [employer] service: Not on file     Active member of club or organization: Not on file     Attends meetings of clubs or organizations: Not on file     Relationship status: Not on file     Intimate partner violence:     Fear of current or ex partner: Not on file     Emotionally abused: Not on file     Physically abused: Not on file     Forced sexual activity: Not on file   Other Topics Concern     Parent/sibling w/ CABG, MI or angioplasty before 65F 55M? Not Asked   Social History Narrative     Not on file       Family History  History reviewed. No pertinent family history.    Review of Systems  As per HPI and PMHx, otherwise 10+ comprehensive system review is negative.    Physical Exam  /87 (BP Location: Right arm, Patient Position: Sitting, Cuff Size: Adult Large)   Pulse 69   Temp 97.4  F (36.3  C) (Tympanic)   Ht 1.88 m (6' 2\")   Wt 109 kg (240 lb 3.2 oz)   BMI 30.84 kg/m    GENERAL: Patient is a pleasant, cooperative 49 year old " male in no acute distress.  HEAD: Normocephalic, atraumatic.  Hair and scalp are normal.  EYES: Pupils are equal, round, reactive to light and accommodation.  Extraocular movements are intact.  The sclera nonicteric without injection.  The extraocular structures are normal.  EARS: Normal shape and symmetry.  No tenderness when palpating the mastoid or tragal areas bilaterally.  Otoscopic exam reveals a minimal amount of cerumen bilaterally.  The bilateral tympanic membranes are round, intact without evidence of effusion, good landmarks.  No retraction, granulation, or drainage.  NOSE: Nares are patent.  Nasal mucosa is pink and moist.  Negative anterior rhinoscopy.  ORAL CAVITY: Dentition is in good repair.  Mucous membranes are moist.  Tongue is mobile, protrudes to the midline.  Palate elevates symmetrically.  Tonsils are surgically absent.  No erythema or exudate.  No oral cavity or oropharyngeal masses, lesions, ulcerations, leukoplakia.  NECK: Supple, trachea is midline.  Palpation of the left neck does reveal some diffuse lymphadenopathy lateral to the SCM just inferior to the parotid tail.  There is about 3 to 4 cm in greatest dimension. The mass is mobile, not adherent to overlying skin. There are no overlying skin changes.  No tenderness or fluctuance. The other occipital, submental, submandibular, internal jugular chain, and supraclavicular chains did not demonstrate any abnormal lymph nodes or masses.  Palpation of the bilateral parotid and submandibular areas reveal no masses.  No thyromegaly.    NEUROLOGIC: Cranial nerves II through XII are grossly intact.  Voice is strong.  Patient is House-Brackman I/VI bilaterally.  CARDIOVASCULAR: Extremities are warm and well-perfused.  No significant peripheral edema.  RESPIRATORY: Patient has nonlabored breathing without cough, wheeze, stridor.  PSYCHIATRIC: Patient is alert and oriented.  Mood and affect appear normal.  SKIN: Warm and dry.  No scalp, face, or  neck lesions noted.    Procedure: Flexible Laryngoscopy  Indication: Neck lymphadenopathy, rule out upper aerodigestive tract malignancy.    To best visualize the upper airway anatomy and due to the chief complaint and HPI, I proceeded with flexible fiberoptic laryngoscopy examination.  The bilateral nasal cavities were anesthetized and decongested with a mixture of lidocaine and neosynephrine.  The bilateral nasal cavities were examined using a flexible fiberoptic laryngoscope.  There were no nasal cavity masses, polyps, or mucopurulence bilaterally.  The nasal septum is essentially midline.  The nasopharynx had a normal appearance with normal Eustachian tube openings and fossa of Rosenmuller bilaterally.  Minimal adenoid tissue.  The base of tongue, vallecula, epiglottis, aryepiglottic folds, arytenoids, and piriform sinuses were without mass or lesion.  The bilateral true vocal folds were symmetrically mobile without nodules or masses.  The visualized portions of the infraglottic and subglottic airway are unremarkable.  The scope was removed.  The patient tolerated the procedure well.    Assessment and Plan     ICD-10-CM    1. Lymphadenopathy of head and neck R59.1 IR Lymph Node Biopsy     LARYNGOSCOPY FLEX FIBEROPTIC, DIAGNOSTIC     It was my pleasure seeing Leobardo Pace today in clinic.  The patient presents today with left neck lymphadenopathy with several large lymph nodes lateral to the sternocleidomastoid muscle.  One of the lymph nodes does abut the parotid gland.  This could be a primary parotid malignancy or parotid neoplasm with lymphadenopathy secondary to inflammation.  The patient is relatively symptomatic.  The differential would also include infectious and inflammatory lymph node disorders.  Obviously lymphoma would be on the diagnostic differential.  I would recommend a core needle biopsy of the left neck lymphadenopathy using ultrasound guidance.  I will place an order for IR biopsy.     I will  contact the patient with the results of the biopsy when available.  If the biopsy is nondiagnostic, we might have to go to the operating room for excision of lymph nodes for diagnosis.    Craig Sanchez MD  Department of Otolarygology-Head and Neck Surgery  [facility]

## 2019-10-25 ENCOUNTER — TELEPHONE (OUTPATIENT)
Dept: OTOLARYNGOLOGY | Facility: CLINIC | Age: 49
End: 2019-10-25

## 2019-10-25 ENCOUNTER — OFFICE VISIT (OUTPATIENT)
Dept: OTOLARYNGOLOGY | Facility: CLINIC | Age: 49
End: 2019-10-25
Payer: COMMERCIAL

## 2019-10-25 VITALS
HEART RATE: 69 BPM | DIASTOLIC BLOOD PRESSURE: 87 MMHG | TEMPERATURE: 97.4 F | WEIGHT: 240.2 LBS | HEIGHT: 74 IN | BODY MASS INDEX: 30.83 KG/M2 | SYSTOLIC BLOOD PRESSURE: 135 MMHG

## 2019-10-25 DIAGNOSIS — R59.1 LYMPHADENOPATHY OF HEAD AND NECK: Primary | ICD-10-CM

## 2019-10-25 PROCEDURE — 99244 OFF/OP CNSLTJ NEW/EST MOD 40: CPT | Mod: 25 | Performed by: OTOLARYNGOLOGY

## 2019-10-25 PROCEDURE — 31575 DIAGNOSTIC LARYNGOSCOPY: CPT | Performed by: OTOLARYNGOLOGY

## 2019-10-25 ASSESSMENT — MIFFLIN-ST. JEOR: SCORE: 2014.29

## 2019-10-25 NOTE — LETTER
10/25/2019         RE: Leobardo Pace  [address] [place] [address]        Dear Colleague,    Thank you for referring your patient, Leobardo Pace, to the [facility]. Please see a copy of my visit note below.    Chief Complaint   Patient presents with     Ent Problem     enlarged lymph nodes     History of Present Illness   Leobardo Pace is a 49 year old male who presents today for evaluation.  I am seeing this patient in consultation for lymphadenopathy at the request of the provider Meghna Santillan PA-C.  The patient reports a roughly 2-month history of a left neck mass.  The patient notes no tenderness or pain. No changes in size recently, but it maybe slowly growing with time. The patient denies any facial numbness, weakness, history of skin cancer or any cancer. No dysphagia, odynphagia, or hoarseness. The mass does not change with eating. No obvious signs of infection including fluctuating size, redness, or purulent drainage. No radiation exposure. The patient is a lifetime non-smoker.     Patient underwent an ultrasound of the left neck on 10/22/2019.  My review of the neck ultrasound does show 3 hypoechoic left neck masses, two of which were overlying the sternocleidal mastoid muscle.  The largest areas 3 centers in greatest dimension.      The patient then underwent a CT scan of his neck with contrast on 10/24/2019.  My review of the neck CT reveals 3 lymph nodes lateral to the sternocleidomastoid muscle, one might be in the tail of the parotid.  The largest measures roughly 2.7 cm.  He also has a large left neck level IB/IIA lymph node measuring nearly 2.8 cm in greatest dimension.  He also has an enlarged level 4 lymph node on the left.    Past Medical History  There is no problem list on file for this patient.    Current Medications     Current Outpatient Medications:      clobetasol (TEMOVATE) 0.05 % external cream, Apply topically 2 times daily (Patient not taking: Reported on  10/21/2019), Disp: 60 g, Rfl: 0     fluocinonide (LIDEX) 0.05 % ointment, Apply sparingly to affected area twice daily as needed.  Do not apply to face. (Patient not taking: Reported on 7/5/2019), Disp: 60 g, Rfl: 1    Allergies  Allergies   Allergen Reactions     Fluconazole      Urticaria after 1st dosage.        Social History   Social History     Socioeconomic History     Marital status:      Spouse name: Not on file     Number of children: Not on file     Years of education: Not on file     Highest education level: Not on file   Occupational History     Not on file   Social Needs     Financial resource strain: Not on file     Food insecurity:     Worry: Not on file     Inability: Not on file     Transportation needs:     Medical: Not on file     Non-medical: Not on file   Tobacco Use     Smoking status: Never Smoker     Smokeless tobacco: Never Used   Substance and Sexual Activity     Alcohol use: No     Frequency: Never     Drug use: No     Sexual activity: Never   Lifestyle     Physical activity:     Days per week: Not on file     Minutes per session: Not on file     Stress: Not on file   Relationships     Social connections:     Talks on phone: Not on file     Gets together: Not on file     Attends [employer] service: Not on file     Active member of club or organization: Not on file     Attends meetings of clubs or organizations: Not on file     Relationship status: Not on file     Intimate partner violence:     Fear of current or ex partner: Not on file     Emotionally abused: Not on file     Physically abused: Not on file     Forced sexual activity: Not on file   Other Topics Concern     Parent/sibling w/ CABG, MI or angioplasty before 65F 55M? Not Asked   Social History Narrative     Not on file       Family History  History reviewed. No pertinent family history.    Review of Systems  As per HPI and PMHx, otherwise 10+ comprehensive system review is negative.    Physical Exam  /87 (BP  "Location: Right arm, Patient Position: Sitting, Cuff Size: Adult Large)   Pulse 69   Temp 97.4  F (36.3  C) (Tympanic)   Ht 1.88 m (6' 2\")   Wt 109 kg (240 lb 3.2 oz)   BMI 30.84 kg/m     GENERAL: Patient is a pleasant, cooperative 49 year old male in no acute distress.  HEAD: Normocephalic, atraumatic.  Hair and scalp are normal.  EYES: Pupils are equal, round, reactive to light and accommodation.  Extraocular movements are intact.  The sclera nonicteric without injection.  The extraocular structures are normal.  EARS: Normal shape and symmetry.  No tenderness when palpating the mastoid or tragal areas bilaterally.  Otoscopic exam reveals a minimal amount of cerumen bilaterally.  The bilateral tympanic membranes are round, intact without evidence of effusion, good landmarks.  No retraction, granulation, or drainage.  NOSE: Nares are patent.  Nasal mucosa is pink and moist.  Negative anterior rhinoscopy.  ORAL CAVITY: Dentition is in good repair.  Mucous membranes are moist.  Tongue is mobile, protrudes to the midline.  Palate elevates symmetrically.  Tonsils are surgically absent.  No erythema or exudate.  No oral cavity or oropharyngeal masses, lesions, ulcerations, leukoplakia.  NECK: Supple, trachea is midline.  Palpation of the left neck does reveal some diffuse lymphadenopathy lateral to the SCM just inferior to the parotid tail.  There is about 3 to 4 cm in greatest dimension. The mass is mobile, not adherent to overlying skin. There are no overlying skin changes.  No tenderness or fluctuance. The other occipital, submental, submandibular, internal jugular chain, and supraclavicular chains did not demonstrate any abnormal lymph nodes or masses.  Palpation of the bilateral parotid and submandibular areas reveal no masses.  No thyromegaly.    NEUROLOGIC: Cranial nerves II through XII are grossly intact.  Voice is strong.  Patient is House-Brackman I/VI bilaterally.  CARDIOVASCULAR: Extremities are warm and " well-perfused.  No significant peripheral edema.  RESPIRATORY: Patient has nonlabored breathing without cough, wheeze, stridor.  PSYCHIATRIC: Patient is alert and oriented.  Mood and affect appear normal.  SKIN: Warm and dry.  No scalp, face, or neck lesions noted.    Procedure: Flexible Laryngoscopy  Indication: Neck lymphadenopathy, rule out upper aerodigestive tract malignancy.    To best visualize the upper airway anatomy and due to the chief complaint and HPI, I proceeded with flexible fiberoptic laryngoscopy examination.  The bilateral nasal cavities were anesthetized and decongested with a mixture of lidocaine and neosynephrine.  The bilateral nasal cavities were examined using a flexible fiberoptic laryngoscope.  There were no nasal cavity masses, polyps, or mucopurulence bilaterally.  The nasal septum is essentially midline.  The nasopharynx had a normal appearance with normal Eustachian tube openings and fossa of Rosenmuller bilaterally.  Minimal adenoid tissue.  The base of tongue, vallecula, epiglottis, aryepiglottic folds, arytenoids, and piriform sinuses were without mass or lesion.  The bilateral true vocal folds were symmetrically mobile without nodules or masses.  The visualized portions of the infraglottic and subglottic airway are unremarkable.  The scope was removed.  The patient tolerated the procedure well.    Assessment and Plan     ICD-10-CM    1. Lymphadenopathy of head and neck R59.1 IR Lymph Node Biopsy     LARYNGOSCOPY FLEX FIBEROPTIC, DIAGNOSTIC     It was my pleasure seeing Leobardo Pace today in clinic.  The patient presents today with left neck lymphadenopathy with several large lymph nodes lateral to the sternocleidomastoid muscle.  One of the lymph nodes does abut the parotid gland.  This could be a primary parotid malignancy or parotid neoplasm with lymphadenopathy secondary to inflammation.  The patient is relatively symptomatic.  The differential would also include infectious and  inflammatory lymph node disorders.  Obviously lymphoma would be on the diagnostic differential.  I would recommend a core needle biopsy of the left neck lymphadenopathy using ultrasound guidance.  I will place an order for IR biopsy.     I will contact the patient with the results of the biopsy when available.  If the biopsy is nondiagnostic, we might have to go to the operating room for excision of lymph nodes for diagnosis.    Craig Sanchez MD  Department of Otolarygology-Head and Neck Surgery  [facility]    Again, thank you for allowing me to participate in the care of your patient.        Sincerely,        Craig Sanchez MD

## 2019-10-25 NOTE — NURSING NOTE
"Initial /87 (BP Location: Right arm, Patient Position: Sitting, Cuff Size: Adult Large)   Pulse 69   Temp 97.4  F (36.3  C) (Tympanic)   Ht 1.88 m (6' 2\")   Wt 109 kg (240 lb 3.2 oz)   BMI 30.84 kg/m   Estimated body mass index is 30.84 kg/m  as calculated from the following:    Height as of this encounter: 1.88 m (6' 2\").    Weight as of this encounter: 109 kg (240 lb 3.2 oz). .    Macey Yu MA    "

## 2019-10-25 NOTE — TELEPHONE ENCOUNTER
I spoke to Dr Sanchez about the request to change the imaging order to US lymph node biopsy. He changed it. Yanni was notified and she will call the patient to schedule. Pattie WOOD Rn

## 2019-10-25 NOTE — TELEPHONE ENCOUNTER
Reason for Call:  Other call back    Detailed comments: Yanni calling from Imaging stating needing to change IR lymph node bx order to US lymph node bx.please call her once this has  Been changed so she can call the pt.     Phone Number Patient can be reached at: Other phone number:  [phone]    Best Time: any     Can we leave a detailed message on this number? YES    Call taken on 10/25/2019 at 3:38 PM by Jing Sanchez

## 2019-11-04 ENCOUNTER — HOSPITAL ENCOUNTER (OUTPATIENT)
Dept: ULTRASOUND IMAGING | Facility: CLINIC | Age: 49
Discharge: HOME | End: 2019-11-04
Attending: OTOLARYNGOLOGY | Admitting: OTOLARYNGOLOGY
Payer: COMMERCIAL

## 2019-11-04 DIAGNOSIS — R59.1 LYMPHADENOPATHY OF HEAD AND NECK: ICD-10-CM

## 2019-11-04 PROCEDURE — 76942 ECHO GUIDE FOR BIOPSY: CPT

## 2019-11-04 PROCEDURE — 88184 FLOWCYTOMETRY/ TC 1 MARKER: CPT | Performed by: RADIOLOGY

## 2019-11-04 PROCEDURE — 88185 FLOWCYTOMETRY/TC ADD-ON: CPT | Performed by: RADIOLOGY

## 2019-11-04 PROCEDURE — 40001004 ZZHCL STATISTIC FLOW INT 9-15 ABY TC 88188: Performed by: RADIOLOGY

## 2019-11-04 PROCEDURE — 25000125 ZZHC RX 250: Performed by: RADIOLOGY

## 2019-11-04 PROCEDURE — 88305 TISSUE EXAM BY PATHOLOGIST: CPT | Mod: 26 | Performed by: RADIOLOGY

## 2019-11-04 PROCEDURE — 88305 TISSUE EXAM BY PATHOLOGIST: CPT | Performed by: RADIOLOGY

## 2019-11-04 RX ADMIN — LIDOCAINE HYDROCHLORIDE 3 ML: 10 INJECTION, SOLUTION EPIDURAL; INFILTRATION; INTRACAUDAL; PERINEURAL at 10:45

## 2019-11-04 NOTE — DISCHARGE INSTRUCTIONS
Discharge Instructions for  Biopsy  Home care  Here are some tips to take care of yourself at home:     You will have a small adhesive bandage on your biopsy site. Leave the bandage on for 4 to 6 hours. After this time, you don't need to keep it covered.     If you feel discomfort after the biopsy, take over-the-counter pain medicine. Don't take aspirin. It's normal to feel sore for a day or two.    Ask your healthcare provider when you can return to work and normal activities. This will likely be the same day as your procedure.  Getting your results  Your biopsy results may take a few days. When the results are ready, your healthcare provider will discuss them with you and tell you what, if anything, needs to be done next.   Follow-up  Make a follow-up appointment as directed by your healthcare provider.  When to call your healthcare provider  Call your healthcare provider right away if you have any of the following:    Bleeding that won t stop    Shortness of breath or trouble breathing    Fever of 100.4 F (38 C) or higher    Increasing pain, redness, tenderness, or drainage at the biopsy site    Swelling of the biopsy site  Be sure you understand what problems you should watch for and know how to reach the healthcare provider after hours and on weekends.   Date Last Reviewed: 6/1/2018 2000-2018 [employer]. [address] [place] [address], [place] [address]. All rights reserved. This information is not intended as a substitute for professional medical care. Always follow your healthcare professional's instructions.

## 2019-11-04 NOTE — PROGRESS NOTES
RADIOLOGY PROCEDURE NOTE  Patient name: Leobardo Pace  MRN: [id number]  : 1970    Pre-procedure diagnosis: left parotid region lesion.  Post-procedure diagnosis: Same    Procedure Date/Time: 2019  10:57 AM  Procedure: US guided needle core biopsy.  Estimated blood loss: None  Specimen(s) collected with description: Four needle cores.  The patient tolerated the procedure well with no immediate complications.    See imaging dictation for procedural details.    Provider name: Emmanuel Padgett MD  Assistant(s):None

## 2019-11-04 NOTE — IP AVS SNAPSHOT
[facility]  [address] [place]  [facility] [address]  Phone:  [phone]                                    After Visit Summary   11/4/2019    Leobardo Pace    MRN: [id number]           After Visit Summary Signature Page    I have received my discharge instructions, and my questions have been answered. I have discussed any challenges I see with this plan with the nurse or doctor.    ..........................................................................................................................................  Patient/Patient Representative Signature      ..........................................................................................................................................  Patient Representative Print Name and Relationship to Patient    ..................................................               ................................................  Date                                   Time    ..........................................................................................................................................  Reviewed by Signature/Title    ...................................................              ..............................................  Date                                               Time          22EPIC Rev 08/18

## 2019-11-05 LAB — COPATH REPORT: NORMAL

## 2019-11-08 LAB — COPATH REPORT: NORMAL

## 2019-11-11 ENCOUNTER — TELEPHONE (OUTPATIENT)
Dept: OTOLARYNGOLOGY | Facility: CLINIC | Age: 49
End: 2019-11-11

## 2019-11-11 DIAGNOSIS — R59.1 LYMPHADENOPATHY OF HEAD AND NECK: Primary | ICD-10-CM

## 2019-11-11 DIAGNOSIS — R22.1 MASS OF LEFT SIDE OF NECK: ICD-10-CM

## 2019-11-11 NOTE — TELEPHONE ENCOUNTER
I spoke with the patient on the phone regarding the results of his lymph node biopsies.  His core needle biopsy did not show any abnormal flow cytometry.  This appeared to be a reactive lymph node, however the possibility of a Hodgkin's lymphoma could not be ruled out.  I think given the significance of his lymphadenopathy, we do have to move forward with excisional lymph node biopsy on the left.  We also discussed possibly testing for infectious disease.  The patient does have exposure to cats and does have farm animals.    We will set the patient up for excision of left neck mass in the operating room.    Craig Sanchez MD  Department of Otolarygology-Head and Neck Surgery  [facility]

## 2019-11-11 NOTE — TELEPHONE ENCOUNTER
Reason for Call:  Request for results:    Name of test or procedure: Bx of neck    Date of test of procedure: 11/04/2019    Location of the test or procedure: [facility]    OK to leave the result message on voice mail or with a family member? YES    Phone number Patient can be reached at:  Home number on file [phone] (home)    Additional comments: NA    Call taken on 11/11/2019 at 2:56 PM by Denise Behrendt

## 2019-11-12 PROBLEM — R59.1 LYMPHADENOPATHY OF HEAD AND NECK: Status: ACTIVE | Noted: 2019-11-12

## 2019-11-12 PROBLEM — R22.1 MASS OF LEFT SIDE OF NECK: Status: ACTIVE | Noted: 2019-11-12

## 2019-11-15 NOTE — PATIENT INSTRUCTIONS
Before Your Surgery      Call your surgeon if there is any change in your health. This includes signs of a cold or flu (such as a sore throat, runny nose, cough, rash or fever).    Do not smoke, drink alcohol or take over the counter medicine (unless your surgeon or primary care doctor tells you to) for the 24 hours before and after surgery.    If you take prescribed drugs: Follow your doctor s orders about which medicines to take and which to stop until after surgery.    Eating and drinking prior to surgery: follow the instructions from your surgeon    Take a shower or bath the night before surgery. Use the soap your surgeon gave you to gently clean your skin. If you do not have soap from your surgeon, use your regular soap. Do not shave or scrub the surgery site.    Wear clean pajamas and have clean sheets on your bed. Your clinic record indicates that you are due for:   Colonoscopy or yearly stool blood testing (FIT), Immunization(s) Td  Q 10 years and complete physical exam  If fasting labs are indicated, call before coming in to let the  know when you are coming in.  Need to be fasting for 10 hrs so just coming in before eating breakfast is the easiest.

## 2019-11-15 NOTE — PROGRESS NOTES
[facility]  [address] [facility] [address]  [phone]  Dept: [phone]    PRE-OP EVALUATION:  Today's date: 2019    Leobardo Pace (: 1970) presents for pre-operative evaluation assessment as requested by Dr. Sanchez.  He requires evaluation and anesthesia risk assessment prior to undergoing surgery/procedure for treatment of   Lymphadenopathy of head and neck   Mass of left side of neck          Proposed Surgery/ Procedure: excision, mass, neck.  Date of Surgery/ Procedure: 19  Time of Surgery/ Procedure: 9:40am  Hospital/Surgical Facility: [facility]    Primary Physician: No Ref-Primary, Physician  Type of Anesthesia Anticipated: General    Patient has a Health Care Directive or Living Will:  NO    1. NO - Do you have a history of heart attack, stroke, stent, bypass or surgery on an artery in the head, neck, heart or legs?  2. NO - Do you ever have any pain or discomfort in your chest?  3. NO - Do you have a history of  Heart Failure?  4. NO - Are you troubled by shortness of breath when: walking on the level, up a slight hill or at night?  5. NO - Do you currently have a cold, bronchitis or other respiratory infection?  6. NO - Do you have a cough, shortness of breath or wheezing?  7. NO - Do you sometimes get pains in the calves of your legs when you walk?  8. NO - Do you or anyone in your family have previous history of blood clots?  9. NO - Do you or does anyone in your family have a serious bleeding problem such as prolonged bleeding following surgeries or cuts?  10. NO - Have you ever had problems with anemia or been told to take iron pills?  11. NO - Have you had any abnormal blood loss such as black, tarry or bloody stools, or abnormal vaginal bleeding?  12. NO - Have you ever had a blood transfusion?  13. NO - Have you or any of your relatives ever had problems with anesthesia?  14. NO - Do you have sleep apnea, excessive snoring or daytime  "drowsiness?  15. NO - Do you have any prosthetic heart valves?  16. NO - Do you have prosthetic joints?  17. NO - Is there any chance that you may be pregnant?      HPI:     HPI related to upcoming procedure:   2 month history of a left sided neck mass.  Biopsy could not rule out Hodgkin's lymphoma - excision was recommended.      MEDICAL HISTORY:     Patient Active Problem List    Diagnosis Date Noted     Lymphadenopathy of head and neck 11/12/2019     Priority: Medium     Added automatically from request for surgery [id number]       Mass of left side of neck 11/12/2019     Priority: Medium     Added automatically from request for surgery [id number]        History reviewed. No pertinent past medical history.  Past Surgical History:   Procedure Laterality Date     KNEE SURGERY Right 1990    removed excess cartilage     No current outpatient medications on file.     OTC products: None, except as noted above    Allergies   Allergen Reactions     Fluconazole      Urticaria after 1st dosage.       Latex Allergy: NO    Social History     Tobacco Use     Smoking status: Never Smoker     Smokeless tobacco: Never Used   Substance Use Topics     Alcohol use: No     Frequency: Never     History   Drug Use No       REVIEW OF SYSTEMS:   Constitutional, neuro, ENT, endocrine, pulmonary, cardiac, gastrointestinal, genitourinary, musculoskeletal, integument and psychiatric systems are negative, except as otherwise noted.    EXAM:   /64 (BP Location: Right arm, Cuff Size: Adult Large)   Pulse 59   Temp 97.6  F (36.4  C) (Tympanic)   Resp 16   Ht 1.88 m (6' 2\")   Wt 107.5 kg (237 lb)   SpO2 98%   BMI 30.43 kg/m      GENERAL APPEARANCE: healthy, alert and no distress     EYES: EOMI,  PERRL     HENT: ear canals and TM's normal and nose and mouth without ulcers or lesions     RESP: lungs clear to auscultation - no rales, rhonchi or wheezes     CV: regular rates and rhythm, normal S1 S2, no S3 or S4 and no murmur, click or rub    "  MS: extremities normal- no gross deformities noted, no evidence of inflammation in joints, FROM in all extremities.     SKIN: no suspicious lesions or rashes     NEURO: Normal strength and tone, sensory exam grossly normal, mentation intact and speech normal     PSYCH: mentation appears normal. and affect normal/bright     LYMPHATICS: No cervical adenopathy    DIAGNOSTICS:     Recent Labs   Lab Test 10/21/19  1443   HGB 14.4        IMPRESSION:   Reason for surgery/procedure: left sided neck mass  Diagnosis/reason for consult: pre-op evaluation    The proposed surgical procedure is considered INTERMEDIATE risk.    REVISED CARDIAC RISK INDEX  The patient has the following serious cardiovascular risks for perioperative complications such as (MI, PE, VFib and 3  AV Block):  No serious cardiac risks  INTERPRETATION: 0 risks: Class I (very low risk - 0.4% complication rate)    The patient has the following additional risks for perioperative complications:  No identified additional risks      ICD-10-CM    1. Preop general physical exam Z01.818    2. Lymphadenopathy of head and neck R59.1    3. Mass of left side of neck R22.1        RECOMMENDATIONS:       Cardiovascular Risk  Performs 4 METs exercise without symptoms (Light housework (dusting, washing dishes), Climb a flight of stairs, Walk on level ground at 15 minutes per mile (4 miles/hour) and Bicycling at 8.5 minutes per mile (7 miles/hour)) .       --Patient is on no chronic medications    APPROVAL GIVEN to proceed with proposed procedure, without further diagnostic evaluation       Signed Electronically by: CARMEN Jarvis CNP    Copy of this evaluation report is provided to requesting physician.    [facility] Preop Guidelines    Revised Cardiac Risk Index

## 2019-11-18 ENCOUNTER — OFFICE VISIT (OUTPATIENT)
Dept: FAMILY MEDICINE | Facility: CLINIC | Age: 49
End: 2019-11-18
Payer: COMMERCIAL

## 2019-11-18 VITALS
OXYGEN SATURATION: 98 % | RESPIRATION RATE: 16 BRPM | DIASTOLIC BLOOD PRESSURE: 64 MMHG | SYSTOLIC BLOOD PRESSURE: 122 MMHG | WEIGHT: 237 LBS | HEART RATE: 59 BPM | TEMPERATURE: 97.6 F | HEIGHT: 74 IN | BODY MASS INDEX: 30.42 KG/M2

## 2019-11-18 DIAGNOSIS — R22.1 MASS OF LEFT SIDE OF NECK: ICD-10-CM

## 2019-11-18 DIAGNOSIS — R59.1 LYMPHADENOPATHY OF HEAD AND NECK: ICD-10-CM

## 2019-11-18 DIAGNOSIS — Z01.818 PREOP GENERAL PHYSICAL EXAM: Primary | ICD-10-CM

## 2019-11-18 PROCEDURE — 99214 OFFICE O/P EST MOD 30 MIN: CPT | Performed by: NURSE PRACTITIONER

## 2019-11-18 ASSESSMENT — MIFFLIN-ST. JEOR: SCORE: 1999.77

## 2019-11-25 ENCOUNTER — ANESTHESIA EVENT (OUTPATIENT)
Dept: SURGERY | Facility: CLINIC | Age: 49
End: 2019-11-25
Payer: COMMERCIAL

## 2019-11-25 NOTE — ANESTHESIA PREPROCEDURE EVALUATION
"Anesthesia Pre-Procedure Evaluation    Patient: Leobardo Pace   MRN: [id number] : 1970          Preoperative Diagnosis: Lymphadenopathy of head and neck [R59.1]  Mass of left side of neck [R22.1]    Procedure(s):  EXCISION, MASS, NECK    No past medical history on file.  Past Surgical History:   Procedure Laterality Date     KNEE SURGERY Right     removed excess cartilage       Anesthesia Evaluation     . Pt has had prior anesthetic. Type: General           ROS/MED HX    ENT/Pulmonary:  - neg pulmonary ROS     Neurologic:  - neg neurologic ROS     Cardiovascular:  - neg cardiovascular ROS       METS/Exercise Tolerance:     Hematologic:  - neg hematologic  ROS       Musculoskeletal:  - neg musculoskeletal ROS       GI/Hepatic:  - neg GI/hepatic ROS       Renal/Genitourinary:  - ROS Renal section negative       Endo:     (+) Other Endocrine Disorder .      Psychiatric:  - neg psychiatric ROS       Infectious Disease:   (+) Other Infectious Disease       Malignancy:      - no malignancy   Other:    - neg other ROS                      Physical Exam  Normal systems: cardiovascular, pulmonary and dental    Airway   Mallampati: II  TM distance: >3 FB  Neck ROM: full    Dental     Cardiovascular       Pulmonary             Lab Results   Component Value Date    WBC 7.2 10/21/2019    HGB 14.4 10/21/2019    HCT 43.2 10/21/2019     10/21/2019       Preop Vitals  BP Readings from Last 3 Encounters:   19 122/64   10/25/19 135/87   10/21/19 128/62    Pulse Readings from Last 3 Encounters:   19 59   10/25/19 69   10/21/19 63      Resp Readings from Last 3 Encounters:   19 16   10/21/19 16   19 12    SpO2 Readings from Last 3 Encounters:   19 98%   10/21/19 99%   19 98%      Temp Readings from Last 1 Encounters:   19 36.4  C (97.6  F) (Tympanic)    Ht Readings from Last 1 Encounters:   19 1.88 m (6' 2\")      Wt Readings from Last 1 Encounters:   19 107.5 kg " "(237 lb)    Estimated body mass index is 30.43 kg/m  as calculated from the following:    Height as of 11/18/19: 1.88 m (6' 2\").    Weight as of 11/18/19: 107.5 kg (237 lb).       Anesthesia Plan      History & Physical Review      ASA Status:  1 .    NPO Status:  > 8 hours    Plan for General and ETT with Intravenous and Propofol induction. Maintenance will be Balanced.    PONV prophylaxis:  Ondansetron (or other 5HT-3) and Dexamethasone or Solumedrol  Additional equipment: Videolaryngoscope      Postoperative Care  Postoperative pain management:  IV analgesics and Oral pain medications.      Consents  Anesthetic plan, risks, benefits and alternatives discussed with:  Patient..                 Thu Cordova CRNA, APRN CRNA  "

## 2019-11-29 ENCOUNTER — ANESTHESIA (OUTPATIENT)
Dept: SURGERY | Facility: CLINIC | Age: 49
End: 2019-11-29
Payer: COMMERCIAL

## 2019-11-29 ENCOUNTER — HOSPITAL ENCOUNTER (OUTPATIENT)
Facility: CLINIC | Age: 49
Discharge: HOME | End: 2019-11-29
Attending: OTOLARYNGOLOGY | Admitting: OTOLARYNGOLOGY
Payer: COMMERCIAL

## 2019-11-29 VITALS
HEART RATE: 46 BPM | SYSTOLIC BLOOD PRESSURE: 121 MMHG | RESPIRATION RATE: 16 BRPM | WEIGHT: 237 LBS | OXYGEN SATURATION: 99 % | DIASTOLIC BLOOD PRESSURE: 71 MMHG | BODY MASS INDEX: 30.42 KG/M2 | TEMPERATURE: 98 F | HEIGHT: 74 IN

## 2019-11-29 DIAGNOSIS — R22.1 MASS OF LEFT SIDE OF NECK: ICD-10-CM

## 2019-11-29 DIAGNOSIS — R59.1 LYMPHADENOPATHY OF HEAD AND NECK: ICD-10-CM

## 2019-11-29 LAB
GRAM STN SPEC: NORMAL
GRAM STN SPEC: NORMAL
SPECIMEN SOURCE: NORMAL

## 2019-11-29 PROCEDURE — 25800030 ZZH RX IP 258 OP 636: Performed by: NURSE ANESTHETIST, CERTIFIED REGISTERED

## 2019-11-29 PROCEDURE — 87076 CULTURE ANAEROBE IDENT EACH: CPT | Performed by: OTOLARYNGOLOGY

## 2019-11-29 PROCEDURE — 36000063 ZZH SURGERY LEVEL 4 EA 15 ADDTL MIN: Performed by: OTOLARYNGOLOGY

## 2019-11-29 PROCEDURE — 37000009 ZZH ANESTHESIA TECHNICAL FEE, EACH ADDTL 15 MIN: Performed by: OTOLARYNGOLOGY

## 2019-11-29 PROCEDURE — 87205 SMEAR GRAM STAIN: CPT | Performed by: OTOLARYNGOLOGY

## 2019-11-29 PROCEDURE — 25000125 ZZHC RX 250: Performed by: NURSE ANESTHETIST, CERTIFIED REGISTERED

## 2019-11-29 PROCEDURE — 37000008 ZZH ANESTHESIA TECHNICAL FEE, 1ST 30 MIN: Performed by: OTOLARYNGOLOGY

## 2019-11-29 PROCEDURE — 40001004 ZZHCL STATISTIC FLOW INT 9-15 ABY TC 88188: Performed by: OTOLARYNGOLOGY

## 2019-11-29 PROCEDURE — 36000093 ZZH SURGERY LEVEL 4 1ST 30 MIN: Performed by: OTOLARYNGOLOGY

## 2019-11-29 PROCEDURE — 27210794 ZZH OR GENERAL SUPPLY STERILE: Performed by: OTOLARYNGOLOGY

## 2019-11-29 PROCEDURE — 25000128 H RX IP 250 OP 636: Performed by: OTOLARYNGOLOGY

## 2019-11-29 PROCEDURE — 88305 TISSUE EXAM BY PATHOLOGIST: CPT | Mod: 26 | Performed by: OTOLARYNGOLOGY

## 2019-11-29 PROCEDURE — 88184 FLOWCYTOMETRY/ TC 1 MARKER: CPT | Performed by: OTOLARYNGOLOGY

## 2019-11-29 PROCEDURE — 88185 FLOWCYTOMETRY/TC ADD-ON: CPT | Performed by: OTOLARYNGOLOGY

## 2019-11-29 PROCEDURE — 71000027 ZZH RECOVERY PHASE 2 EACH 15 MINS: Performed by: OTOLARYNGOLOGY

## 2019-11-29 PROCEDURE — 88305 TISSUE EXAM BY PATHOLOGIST: CPT | Performed by: OTOLARYNGOLOGY

## 2019-11-29 PROCEDURE — 87070 CULTURE OTHR SPECIMN AEROBIC: CPT | Performed by: OTOLARYNGOLOGY

## 2019-11-29 PROCEDURE — 87075 CULTR BACTERIA EXCEPT BLOOD: CPT | Performed by: OTOLARYNGOLOGY

## 2019-11-29 PROCEDURE — 25000132 ZZH RX MED GY IP 250 OP 250 PS 637: Performed by: NURSE ANESTHETIST, CERTIFIED REGISTERED

## 2019-11-29 PROCEDURE — 40000305 ZZH STATISTIC PRE PROC ASSESS I: Performed by: OTOLARYNGOLOGY

## 2019-11-29 PROCEDURE — 25000566 ZZH SEVOFLURANE, EA 15 MIN: Performed by: OTOLARYNGOLOGY

## 2019-11-29 PROCEDURE — 38510 BIOPSY/REMOVAL LYMPH NODES: CPT | Mod: LT | Performed by: OTOLARYNGOLOGY

## 2019-11-29 PROCEDURE — 25000125 ZZHC RX 250: Performed by: OTOLARYNGOLOGY

## 2019-11-29 PROCEDURE — 71000012 ZZH RECOVERY PHASE 1 LEVEL 1 FIRST HR: Performed by: OTOLARYNGOLOGY

## 2019-11-29 PROCEDURE — 25000132 ZZH RX MED GY IP 250 OP 250 PS 637: Performed by: OTOLARYNGOLOGY

## 2019-11-29 PROCEDURE — 25000128 H RX IP 250 OP 636: Performed by: NURSE ANESTHETIST, CERTIFIED REGISTERED

## 2019-11-29 RX ORDER — DEXAMETHASONE SODIUM PHOSPHATE 10 MG/ML
10 INJECTION, SOLUTION INTRAMUSCULAR; INTRAVENOUS ONCE
Status: DISCONTINUED | OUTPATIENT
Start: 2019-11-29 | End: 2019-11-29 | Stop reason: HOSPADM

## 2019-11-29 RX ORDER — KETOROLAC TROMETHAMINE 30 MG/ML
30 INJECTION, SOLUTION INTRAMUSCULAR; INTRAVENOUS EVERY 6 HOURS PRN
Status: DISCONTINUED | OUTPATIENT
Start: 2019-11-29 | End: 2019-11-29 | Stop reason: HOSPADM

## 2019-11-29 RX ORDER — FENTANYL CITRATE 50 UG/ML
25-50 INJECTION, SOLUTION INTRAMUSCULAR; INTRAVENOUS
Status: DISCONTINUED | OUTPATIENT
Start: 2019-11-29 | End: 2019-11-29 | Stop reason: HOSPADM

## 2019-11-29 RX ORDER — HYDROMORPHONE HYDROCHLORIDE 1 MG/ML
.3-.5 INJECTION, SOLUTION INTRAMUSCULAR; INTRAVENOUS; SUBCUTANEOUS EVERY 10 MIN PRN
Status: DISCONTINUED | OUTPATIENT
Start: 2019-11-29 | End: 2019-11-29 | Stop reason: HOSPADM

## 2019-11-29 RX ORDER — SODIUM CHLORIDE, SODIUM LACTATE, POTASSIUM CHLORIDE, CALCIUM CHLORIDE 600; 310; 30; 20 MG/100ML; MG/100ML; MG/100ML; MG/100ML
INJECTION, SOLUTION INTRAVENOUS CONTINUOUS
Status: DISCONTINUED | OUTPATIENT
Start: 2019-11-29 | End: 2019-11-29 | Stop reason: HOSPADM

## 2019-11-29 RX ORDER — DEXAMETHASONE SODIUM PHOSPHATE 4 MG/ML
INJECTION, SOLUTION INTRA-ARTICULAR; INTRALESIONAL; INTRAMUSCULAR; INTRAVENOUS; SOFT TISSUE PRN
Status: DISCONTINUED | OUTPATIENT
Start: 2019-11-29 | End: 2019-11-29

## 2019-11-29 RX ORDER — IBUPROFEN 400 MG/1
400 TABLET, FILM COATED ORAL
Status: CANCELLED | OUTPATIENT
Start: 2019-11-29

## 2019-11-29 RX ORDER — ONDANSETRON 4 MG/1
4 TABLET, ORALLY DISINTEGRATING ORAL EVERY 30 MIN PRN
Status: DISCONTINUED | OUTPATIENT
Start: 2019-11-29 | End: 2019-11-29 | Stop reason: HOSPADM

## 2019-11-29 RX ORDER — KETOROLAC TROMETHAMINE 30 MG/ML
INJECTION, SOLUTION INTRAMUSCULAR; INTRAVENOUS PRN
Status: DISCONTINUED | OUTPATIENT
Start: 2019-11-29 | End: 2019-11-29

## 2019-11-29 RX ORDER — NALOXONE HYDROCHLORIDE 0.4 MG/ML
.1-.4 INJECTION, SOLUTION INTRAMUSCULAR; INTRAVENOUS; SUBCUTANEOUS
Status: DISCONTINUED | OUTPATIENT
Start: 2019-11-29 | End: 2019-11-29 | Stop reason: HOSPADM

## 2019-11-29 RX ORDER — ACETAMINOPHEN 325 MG/1
975 TABLET ORAL ONCE
Status: COMPLETED | OUTPATIENT
Start: 2019-11-29 | End: 2019-11-29

## 2019-11-29 RX ORDER — ACETAMINOPHEN 500 MG
1000 TABLET ORAL EVERY 6 HOURS
Qty: 200 TABLET | Refills: 1 | Status: SHIPPED | OUTPATIENT
Start: 2019-11-29 | End: 2019-12-18

## 2019-11-29 RX ORDER — FENTANYL CITRATE 50 UG/ML
INJECTION, SOLUTION INTRAMUSCULAR; INTRAVENOUS PRN
Status: DISCONTINUED | OUTPATIENT
Start: 2019-11-29 | End: 2019-11-29

## 2019-11-29 RX ORDER — SCOLOPAMINE TRANSDERMAL SYSTEM 1 MG/1
1 PATCH, EXTENDED RELEASE TRANSDERMAL ONCE
Status: COMPLETED | OUTPATIENT
Start: 2019-11-29 | End: 2019-11-29

## 2019-11-29 RX ORDER — MEPERIDINE HYDROCHLORIDE 25 MG/ML
12.5 INJECTION INTRAMUSCULAR; INTRAVENOUS; SUBCUTANEOUS
Status: DISCONTINUED | OUTPATIENT
Start: 2019-11-29 | End: 2019-11-29 | Stop reason: HOSPADM

## 2019-11-29 RX ORDER — GABAPENTIN 300 MG/1
300 CAPSULE ORAL ONCE
Status: COMPLETED | OUTPATIENT
Start: 2019-11-29 | End: 2019-11-29

## 2019-11-29 RX ORDER — SENNA AND DOCUSATE SODIUM 50; 8.6 MG/1; MG/1
1 TABLET, FILM COATED ORAL AT BEDTIME
Qty: 30 TABLET | Refills: 1 | Status: SHIPPED | OUTPATIENT
Start: 2019-11-29 | End: 2019-12-18

## 2019-11-29 RX ORDER — FENTANYL CITRATE 50 UG/ML
25-50 INJECTION, SOLUTION INTRAMUSCULAR; INTRAVENOUS
Status: CANCELLED | OUTPATIENT
Start: 2019-11-29

## 2019-11-29 RX ORDER — LIDOCAINE HYDROCHLORIDE 10 MG/ML
INJECTION, SOLUTION INFILTRATION; PERINEURAL PRN
Status: DISCONTINUED | OUTPATIENT
Start: 2019-11-29 | End: 2019-11-29

## 2019-11-29 RX ORDER — CEFAZOLIN SODIUM 1 G/50ML
1 INJECTION, SOLUTION INTRAVENOUS SEE ADMIN INSTRUCTIONS
Status: DISCONTINUED | OUTPATIENT
Start: 2019-11-29 | End: 2019-11-29 | Stop reason: HOSPADM

## 2019-11-29 RX ORDER — DEXAMETHASONE SODIUM PHOSPHATE 4 MG/ML
4 INJECTION, SOLUTION INTRA-ARTICULAR; INTRALESIONAL; INTRAMUSCULAR; INTRAVENOUS; SOFT TISSUE EVERY 10 MIN PRN
Status: DISCONTINUED | OUTPATIENT
Start: 2019-11-29 | End: 2019-11-29 | Stop reason: HOSPADM

## 2019-11-29 RX ORDER — ALBUTEROL SULFATE 0.83 MG/ML
2.5 SOLUTION RESPIRATORY (INHALATION) EVERY 4 HOURS PRN
Status: DISCONTINUED | OUTPATIENT
Start: 2019-11-29 | End: 2019-11-29 | Stop reason: HOSPADM

## 2019-11-29 RX ORDER — LIDOCAINE 40 MG/G
CREAM TOPICAL
Status: DISCONTINUED | OUTPATIENT
Start: 2019-11-29 | End: 2019-11-29 | Stop reason: HOSPADM

## 2019-11-29 RX ORDER — HYDRALAZINE HYDROCHLORIDE 20 MG/ML
2.5-5 INJECTION INTRAMUSCULAR; INTRAVENOUS EVERY 10 MIN PRN
Status: DISCONTINUED | OUTPATIENT
Start: 2019-11-29 | End: 2019-11-29 | Stop reason: HOSPADM

## 2019-11-29 RX ORDER — OXYCODONE HYDROCHLORIDE 5 MG/1
5 TABLET ORAL
Status: CANCELLED | OUTPATIENT
Start: 2019-11-29

## 2019-11-29 RX ORDER — PROPOFOL 10 MG/ML
INJECTION, EMULSION INTRAVENOUS PRN
Status: DISCONTINUED | OUTPATIENT
Start: 2019-11-29 | End: 2019-11-29

## 2019-11-29 RX ORDER — LIDOCAINE HYDROCHLORIDE AND EPINEPHRINE 10; 10 MG/ML; UG/ML
INJECTION, SOLUTION INFILTRATION; PERINEURAL PRN
Status: DISCONTINUED | OUTPATIENT
Start: 2019-11-29 | End: 2019-11-29 | Stop reason: HOSPADM

## 2019-11-29 RX ORDER — ONDANSETRON 2 MG/ML
4 INJECTION INTRAMUSCULAR; INTRAVENOUS EVERY 30 MIN PRN
Status: DISCONTINUED | OUTPATIENT
Start: 2019-11-29 | End: 2019-11-29 | Stop reason: HOSPADM

## 2019-11-29 RX ORDER — ONDANSETRON 2 MG/ML
INJECTION INTRAMUSCULAR; INTRAVENOUS PRN
Status: DISCONTINUED | OUTPATIENT
Start: 2019-11-29 | End: 2019-11-29

## 2019-11-29 RX ORDER — GLYCOPYRROLATE 0.2 MG/ML
INJECTION, SOLUTION INTRAMUSCULAR; INTRAVENOUS PRN
Status: DISCONTINUED | OUTPATIENT
Start: 2019-11-29 | End: 2019-11-29

## 2019-11-29 RX ORDER — CEFAZOLIN SODIUM 2 G/100ML
2 INJECTION, SOLUTION INTRAVENOUS
Status: COMPLETED | OUTPATIENT
Start: 2019-11-29 | End: 2019-11-29

## 2019-11-29 RX ORDER — OXYCODONE HYDROCHLORIDE 5 MG/1
5 TABLET ORAL EVERY 4 HOURS PRN
Qty: 10 TABLET | Refills: 0 | Status: SHIPPED | OUTPATIENT
Start: 2019-11-29 | End: 2019-12-18

## 2019-11-29 RX ORDER — ACETAMINOPHEN 325 MG/1
975 TABLET ORAL ONCE
Status: DISCONTINUED | OUTPATIENT
Start: 2019-11-29 | End: 2019-11-29

## 2019-11-29 RX ADMIN — FENTANYL CITRATE 100 MCG: 50 INJECTION, SOLUTION INTRAMUSCULAR; INTRAVENOUS at 10:33

## 2019-11-29 RX ADMIN — FENTANYL CITRATE 50 MCG: 50 INJECTION, SOLUTION INTRAMUSCULAR; INTRAVENOUS at 11:14

## 2019-11-29 RX ADMIN — Medication 100 MG: at 10:35

## 2019-11-29 RX ADMIN — LIDOCAINE HYDROCHLORIDE 50 MG: 10 INJECTION, SOLUTION INFILTRATION; PERINEURAL at 10:35

## 2019-11-29 RX ADMIN — FENTANYL CITRATE 100 MCG: 50 INJECTION, SOLUTION INTRAMUSCULAR; INTRAVENOUS at 10:48

## 2019-11-29 RX ADMIN — ROCURONIUM BROMIDE 10 MG: 10 INJECTION INTRAVENOUS at 10:35

## 2019-11-29 RX ADMIN — ACETAMINOPHEN 975 MG: 325 TABLET, FILM COATED ORAL at 09:52

## 2019-11-29 RX ADMIN — PROPOFOL 50 MG: 10 INJECTION, EMULSION INTRAVENOUS at 10:47

## 2019-11-29 RX ADMIN — KETOROLAC TROMETHAMINE 30 MG: 30 INJECTION, SOLUTION INTRAMUSCULAR at 11:16

## 2019-11-29 RX ADMIN — DEXAMETHASONE SODIUM PHOSPHATE 10 MG: 4 INJECTION, SOLUTION INTRA-ARTICULAR; INTRALESIONAL; INTRAMUSCULAR; INTRAVENOUS; SOFT TISSUE at 10:40

## 2019-11-29 RX ADMIN — MIDAZOLAM 2 MG: 1 INJECTION INTRAMUSCULAR; INTRAVENOUS at 10:31

## 2019-11-29 RX ADMIN — GABAPENTIN 300 MG: 300 CAPSULE ORAL at 09:52

## 2019-11-29 RX ADMIN — ONDANSETRON 4 MG: 2 INJECTION INTRAMUSCULAR; INTRAVENOUS at 11:16

## 2019-11-29 RX ADMIN — GLYCOPYRROLATE 0.2 MG: 0.2 INJECTION, SOLUTION INTRAMUSCULAR; INTRAVENOUS at 10:35

## 2019-11-29 RX ADMIN — CEFAZOLIN SODIUM 2 G: 2 INJECTION, SOLUTION INTRAVENOUS at 10:31

## 2019-11-29 RX ADMIN — LIDOCAINE HYDROCHLORIDE 1 ML: 10 INJECTION, SOLUTION EPIDURAL; INFILTRATION; INTRACAUDAL; PERINEURAL at 09:45

## 2019-11-29 RX ADMIN — SODIUM CHLORIDE, POTASSIUM CHLORIDE, SODIUM LACTATE AND CALCIUM CHLORIDE: 600; 310; 30; 20 INJECTION, SOLUTION INTRAVENOUS at 09:46

## 2019-11-29 RX ADMIN — SCOPOLAMINE 1 PATCH: 1 PATCH TRANSDERMAL at 10:17

## 2019-11-29 RX ADMIN — PROPOFOL 150 MG: 10 INJECTION, EMULSION INTRAVENOUS at 10:35

## 2019-11-29 ASSESSMENT — MIFFLIN-ST. JEOR: SCORE: 1999.77

## 2019-11-29 NOTE — ANESTHESIA CARE TRANSFER NOTE
Patient: Leobardo Pace    Procedure(s):  EXCISION, MASS, NECK    Diagnosis: Lymphadenopathy of head and neck [R59.1]  Mass of left side of neck [R22.1]  Diagnosis Additional Information: No value filed.    Anesthesia Type:   General, ETT     Note:  Anesthesia Care Transfer Notewriter    Vitals: (Last set prior to Anesthesia Care Transfer)    CRNA VITALS  11/29/2019 1055 - 11/29/2019 1127      11/29/2019             Pulse:  78    SpO2:  97 %                Electronically Signed By: CARMEN Saavedra CRNA  November 29, 2019  11:27 AM

## 2019-11-29 NOTE — ANESTHESIA POSTPROCEDURE EVALUATION
Patient: Leobardo Pace    Procedure(s):  EXCISION, MASS, NECK    Diagnosis:Lymphadenopathy of head and neck [R59.1]  Mass of left side of neck [R22.1]  Diagnosis Additional Information: No value filed.    Anesthesia Type:  General, ETT    Note:  Anesthesia Post Evaluation    Patient location during evaluation: Phase 2  Level of consciousness: awake and alert  Pain management: adequate  Airway patency: patent  Cardiovascular status: stable  Respiratory status: room air  Hydration status: stable  PONV: none     Anesthetic complications: None          Last vitals:  Vitals:    11/29/19 1218 11/29/19 1230 11/29/19 1245   BP: 116/69 121/71    Pulse:  (!) 46    Resp: 14 14 16   Temp:      SpO2: 98% 99%          Electronically Signed By: CARMEN uLcio CRNA  November 29, 2019  1:57 PM  
WDL

## 2019-11-29 NOTE — ANESTHESIA CARE TRANSFER NOTE
Patient: Leobardo Pace    Procedure(s):  EXCISION, MASS, NECK    Diagnosis: Lymphadenopathy of head and neck [R59.1]  Mass of left side of neck [R22.1]  Diagnosis Additional Information: No value filed.    Anesthesia Type:   General, ETT     Note:  Airway :Face Mask  Patient transferred to:PACU  Handoff Report: Identifed the Patient, Identified the Reponsible Provider, Reviewed the pertinent medical history, Discussed the surgical course, Reviewed Intra-OP anesthesia mangement and issues during anesthesia, Set expectations for post-procedure period and Allowed opportunity for questions and acknowledgement of understanding      Vitals: (Last set prior to Anesthesia Care Transfer)    CRNA VITALS  11/29/2019 1055 - 11/29/2019 1127      11/29/2019             Pulse:  78    SpO2:  97 %                Electronically Signed By: CARMEN Saavedra CRNA  November 29, 2019  11:27 AM

## 2019-11-29 NOTE — DISCHARGE INSTRUCTIONS
Same Day Surgery Discharge Instructions  Special Precautions After Surgery - Adult    1. It is not unusual to feel lightheaded or faint, up to 24 hours after surgery or while taking pain medication.  If you have these symptoms; sit for a few minutes before standing and have someone assist you when getting up.  2. You should rest and relax for the next 24 hours and must have someone stay with you for at least 24 hours after your discharge.  3. DO NOT DRIVE any vehicle or operate mechanical equipment for 24 hours following the end of your surgery.  DO NOT DRIVE while taking narcotic pain medications that have been prescribed by your physician.  If you had a limb operated on, you must be able to use it fully to drive.  4. DO NOT drink alcoholic beverages for 24 hours following surgery or while taking prescription pain medication.  5. Drink clear liquids (apple juice, ginger ale, broth, 7-Up, etc.).  Progress to your regular diet as you feel able.  6. Any questions call your physician and do not make important decisions for 24 hours.         __________________________________________________________________________________________________________________________________  IMPORTANT NUMBERS:    [facility] Main Number:  [phone], [phone]  Pharmacy:  [phone]  Same Day Surgery:  [phone], Monday - Friday until 8:30 p.m.  Urgent Care:  [phone]  Emergency Room:  [phone]          Surgery Specialty Clinic:  [phone] --  Dr. Sanchez            Discharge Instructions for Neck Surgery    Recovery - Everyone recovers differently from a general anesthetic.  Symptoms such as fatigue, nausea, lightheadedness, and sometimes a low grade fever (up to 101 degrees) are not unusual.  As your body removes the anesthetic drugs from circulation, these symptoms will resolve.  The incision area will be sore after surgery, and a mild amount of swelling and redness is normal.  Use ice packs as needed.   There are no diet restrictions after a minor neck operation, and you can resume your home medications, except blood thinners such as aspirin or coumadin, which should not be taken for 1-5 days after surgery. Clarify the length of time with your surgeon.  Limit your activity to no strenuous activities until I see you for the first follow up visit, and sleep with your head and neck elevated.  I recommend no heavy lifting greater than 15-20 pounds for the first 10-14 days following surgery.  You can shower and let the water run over the incision 48 hours after surgery. Do not scrub the incision, but pat it dry when you are finished. Also, do not submerge the incision in a tub until it is entirely healed (approximately 4 weeks following surgery).       Medications - You were sent home with narcotic pain medication.  If you can tolerate the discomfort during your recovery by using Tylenol or Ibuprofen (advil), please do so.  However, do not hesitate to use the stronger pain medication if needed.     Complications - Problems related to open neck operations are usually either due to infection or bleeding.  Signs of infection such as redness, increasing pain, swelling, pus at the incision, and fever should be reported as soon as possible.  The most feared complication is acute bleeding creating pressure on the throat or trachea (wind pipe).  If there is a fast growing swelling of the surgical area and difficulty breathing or swallowing (like being choked), this is an emergency.  If it is slow but persistent, be taken to an emergency room.  If you feel like your breathing is being blocked - Call 911.    Follow up - If you have a drain in place, this can be taken out 24-48 hrs after surgery. We will review the pathology at your first follow-up if available, and I will examine the surgery site to make sure there are no troubles with healing.  Skin numbness around an incision on the neck is common, and usually slowly returns  over the course of several weeks, and sometimes a few months.    If there are any questions or issues with the above, or if there are other issues that concern you, always feel free to call the clinic and I am happy to speak with you as soon as feasible.    Craig Sanchez MD  Department of Otolarygology-Head and Neck Surgery  [facility]  [phone] or [phone] After hours, [facility] option    Discharge instructions for Patient with Scopolamine Transdermal Patch    1.  You may leave the patch on behind your ear for three days-But NO LONGER.  May have withdrawal symptoms (nausea, vomiting, headache,dizziness) if used longer.  2.  When you remove the patch, you must wash and dry your hands thoroughly and before touching your eyes, as pupils may dilate.  3.  Discard patch (away from children and pets).  4.  May develop some urinary hesitancy or urine retention.  5.  Patch should be removed by:___dec 1st________________

## 2019-11-29 NOTE — OP NOTE
PREOPERATIVE DIAGNOSES: Left neck mass.     POSTOPERATIVE DIAGNOSES: Same.     PROCEDURE PERFORMED:   1. Excision of left neck mass.     SURGEON: Craig Sanchez MD     ASSISTANTS: Naveen Elliott PA-C     BLOOD LOSS: 10 mL.    COMPLICATIONS: None.     SPECIMENS: Left neck mass for culture, permanent pathology, lymphoma protocol.    ANESTHESIA: General.    GRAFTS, IMPLANTS, DRAINS: None.    INDICATIONS: Obtain diagnosis, treatment.    FINDINGS:   1. A conglomeration of multiple round abnormal appearing lymph nodes lateral to the left sternocleidomastoid muscle just inferior to the parotid tail.     OPERATIVE TECHNIQUE: The patient was brought to the operating room and identified by name clinic number.  They were placed supinely on the operating room table and general endotracheal anesthesia was induced by the anesthesia service.  The bed was rotated 90 degrees and the patient was prepped and draped in standard fashion.  A left neck utility incision was marked in a pre-existing skin crease and the incision was injected with 1% lidocaine 1:100,000 epinephrine.  After standard surgical pause, a 15 blade was used to make an incision through the skin, subcutaneous tissues, and platysma.  Subplatysmal flaps were raised superiorly and inferiorly.  There was a 3 x 3 cm conglomeration of abnormal appearing round lymph nodes lateral to the sternal cleidomastoid muscle just inferior to the parotid tail on the left-hand side.  I selected a few of the lymph nodes for dissection.  The abnormal appearing lymph nodes were carefully dissected maintaining vascular control with bipolar cautery.  The specimens were circumferentially dissected and removed from the surgical field.  The greater auricular nerve was identified and preserved.  The specimens were sent for low cytometry, permanent pathologic analysis, and cultures.      The wound was copiously irrigated with normal saline.  Valsalva was performed and hemostasis was assured.  The  decision was made not to place a drain.  The wound was then closed with deep interrupted 4-0 Monocryl sutures and Dermabond for skin.    This marked the end of the procedure.  The patient was then turned over to anesthesia for recovery where they were awakened, extubated, and transferred to the PACU in excellent condition.  There were no complications.  There was minimal blood loss.  All standard operating room protocol and universal precautions were used throughout the procedure. Naveen Elliott PA-C assisted for the procedure and was required for patient positioning, prepping, draping, surgical retraction, excellent tissue handling, and closure.       Craig Sanchez MD  Department of Otolarygology-Head and Neck Surgery  [facility]

## 2019-12-02 LAB — COPATH REPORT: NORMAL

## 2019-12-04 LAB — COPATH REPORT: NORMAL

## 2019-12-05 LAB
BACTERIA SPEC CULT: ABNORMAL
SPECIMEN SOURCE: ABNORMAL

## 2019-12-06 ENCOUNTER — TELEPHONE (OUTPATIENT)
Dept: OTOLARYNGOLOGY | Facility: CLINIC | Age: 49
End: 2019-12-06

## 2019-12-06 DIAGNOSIS — R59.1 LYMPHADENOPATHY OF HEAD AND NECK: Primary | ICD-10-CM

## 2019-12-06 DIAGNOSIS — R22.1 MASS OF LEFT SIDE OF NECK: ICD-10-CM

## 2019-12-06 LAB
BACTERIA SPEC CULT: ABNORMAL
BACTERIA SPEC CULT: ABNORMAL
SPECIMEN SOURCE: ABNORMAL

## 2019-12-06 NOTE — TELEPHONE ENCOUNTER
I spoke to the patient on the phone regarding the results of his excisional lymph node biopsy.  His biopsy appears to be florid lymphoid hyperplasia and reactive lymphadenopathy without any atypical cells or signs of lymphoma.  The pathologist was concerned for possible inflammatory condition or rheumatologic condition.  I will order some baseline laboratory inflammatory markers, and a, rheumatoid factor.  Also order Bartonella and Brucella antibodies.  I might have to refer the patient to rheumatology and/or hematology to help me figure out his lymphadenopathy.  We will see him back on December 18 for follow-up.    Craig Sanchez MD  Department of Otolarygology-Head and Neck Surgery  [facility]

## 2019-12-11 ENCOUNTER — TELEPHONE (OUTPATIENT)
Dept: FAMILY MEDICINE | Facility: CLINIC | Age: 49
End: 2019-12-11

## 2019-12-11 NOTE — LETTER
[facility]  [address] [facility] [place] [address]  [phone]    December 11, 2019    Leobardo Pace  [address] [place] [address] [place] [address]          Dear Leobardo,    --Colon cancer screening is generally recommended in all patients over the age of 50 years of age. This can be with either colonoscopy every 10 years, or testing the stool for blood one time per year (called a FIT test, a simple card you can send back to us in the mail). On review of our electronic medical record it appears you are due for colon cancer screening. Please call the clinic to assist in setting up a colonoscopy or, if you prefer, setting up the test for stool blood(FIT).      Please disregard this notice if you have already scheduled an appointment.     Sincerely,    Marisa IGLESIAS  [facility] - [phone]  [email or web address]

## 2019-12-11 NOTE — TELEPHONE ENCOUNTER
Panel Management Review          Composite cancer screening  Chart review shows that this patient is due/due soon for the following Colonoscopy  Summary:    Patient is due/failing the following:   COLONOSCOPY    Action needed:   Patient needs referral/order: colonoscopy vs. FIT    Type of outreach:    Sent letter.    Questions for provider review:    None                                                                                                                                    KATHY KNIGHT

## 2019-12-13 ENCOUNTER — TELEPHONE (OUTPATIENT)
Dept: OTOLARYNGOLOGY | Facility: CLINIC | Age: 49
End: 2019-12-13

## 2019-12-17 NOTE — PROGRESS NOTES
Chief Complaint   Patient presents with     Post-op Visit     Post op left neck mass 11-29-19     History of Present Illness  Leobardo Pace is a 49 year old male who presents today for surgical follow-up.  The patient went to the operating room on 11/29/2019 for excision multiple left preauricular chain lymph nodes it appeared very abnormal on imaging and physical exam.  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  There is no signs of lymphoma or atypical proliferation on flow cytometry.  I spoke with hematology and the pathologist.  Discussed the condition known in the literature called progressive transformation of germinal centers.  There is some link between this entity and Hodgkin's lymphoma.  Usually they recommend close surveillance to watch for signs of Hodgkin's lymphoma.    Since surgery, the patient reports a significant decrease in his neck swelling.  The incision is healing well.  No problems with incisional pain or drainage.  No redness or swelling.    Past Medical History  Patient Active Problem List   Diagnosis     Lymphadenopathy of head and neck     Mass of left side of neck     Current Medications  No current outpatient medications on file.    Allergies  Allergies   Allergen Reactions     Fluconazole      Urticaria after 1st dosage.        Social History  Social History     Socioeconomic History     Marital status:      Spouse name: Not on file     Number of children: Not on file     Years of education: Not on file     Highest education level: Not on file   Occupational History     Not on file   Social Needs     Financial resource strain: Not on file     Food insecurity:     Worry: Not on file     Inability: Not on file     Transportation needs:     Medical: Not on file     Non-medical: Not on file   Tobacco Use     Smoking status: Never Smoker     Smokeless tobacco: Never Used   Substance and Sexual Activity     Alcohol use:  "No     Frequency: Never     Drug use: No     Sexual activity: Never   Lifestyle     Physical activity:     Days per week: Not on file     Minutes per session: Not on file     Stress: Not on file   Relationships     Social connections:     Talks on phone: Not on file     Gets together: Not on file     Attends Jewish service: Not on file     Active member of club or organization: Not on file     Attends meetings of clubs or organizations: Not on file     Relationship status: Not on file     Intimate partner violence:     Fear of current or ex partner: Not on file     Emotionally abused: Not on file     Physically abused: Not on file     Forced sexual activity: Not on file   Other Topics Concern     Parent/sibling w/ CABG, MI or angioplasty before 65F 55M? Not Asked   Social History Narrative     Not on file       Family History  Family History   Family history unknown: Yes       Review of Systems  As per HPI and PMHx, otherwise 10 system review including the head and neck, constitutional, eyes, respiratory, GI, skin, neurologic, lymphatic, endocrine, and allergy systems is negative.    Physical Exam  /72 (BP Location: Right arm, Patient Position: Sitting, Cuff Size: Adult Regular)   Pulse 62   Temp 97.6  F (36.4  C) (Oral)   Ht 1.88 m (6' 2\")   Wt 107.5 kg (237 lb)   BMI 30.43 kg/m    GENERAL: Patient is a pleasant, cooperative 49 year old male in no acute distress.  HEAD: Normocephalic, atraumatic.  Hair and scalp are normal.  EYES: Pupils are equal, round, reactive to light and accommodation.  Extraocular movements are intact.  The sclera nonicteric without injection.  The extraocular structures are normal.  EARS: Normal shape and symmetry.  No tenderness when palpating the mastoid or tragal areas bilaterally.  NOSE: Nares are patent.  Nasal mucosa is pink and moist.  Negative anterior rhinoscopy.  NECK: Supple, trachea is midline.  The left neck incision is clean, dry, intact.  No evidence of hematoma " or seroma.  The patient has had an interval decrease in his obvious lymphadenopathy.  NEUROLOGIC: Cranial nerves II through XII are grossly intact.  Voice is strong.  Patient is House-Brackman I/VI bilaterally.  CARDIOVASCULAR: Extremities are warm and well-perfused.  No significant peripheral edema.  RESPIRATORY: Patient has nonlabored breathing without cough, wheeze, stridor.  PSYCHIATRIC: Patient is alert and oriented.  Mood and affect appear normal.  SKIN: Warm and dry.  No scalp, face, or neck lesions noted.    Assessment and Plan     ICD-10-CM    1. Lymphadenopathy of head and neck R59.1 US Head Neck Soft Tissue   2. Mass of left side of neck R22.1 US Head Neck Soft Tissue   3. Postoperative state Z98.890 US Head Neck Soft Tissue      It was my pleasure seeing Leobardo Pace today in clinic.    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  There is no signs of lymphoma or atypical proliferation on flow cytometry.    Based on review of the literature and discussing with both the pathologist, hematology, and radiation oncology, this is a benign lymph node process.  I will work him up for other infectious or inflammatory disorders with some blood work.  If that is all negative, I would recommend the patient return in 3 to 4 months for repeat examination with a neck ultrasound to reevaluate the lymph nodes.  The patient knows to contact me sooner if he is having new symptoms, increasing neck swelling, or other concerns.    Craig Sanchez MD  Department of Otolarygology-Head and Neck Surgery  [facility]

## 2019-12-18 ENCOUNTER — TELEPHONE (OUTPATIENT)
Dept: DERMATOLOGY | Facility: CLINIC | Age: 49
End: 2019-12-18

## 2019-12-18 ENCOUNTER — OFFICE VISIT (OUTPATIENT)
Dept: OTOLARYNGOLOGY | Facility: CLINIC | Age: 49
End: 2019-12-18
Payer: COMMERCIAL

## 2019-12-18 VITALS
DIASTOLIC BLOOD PRESSURE: 72 MMHG | SYSTOLIC BLOOD PRESSURE: 130 MMHG | HEIGHT: 74 IN | HEART RATE: 62 BPM | BODY MASS INDEX: 30.42 KG/M2 | WEIGHT: 237 LBS | TEMPERATURE: 97.6 F

## 2019-12-18 DIAGNOSIS — L30.9 DERMATITIS: ICD-10-CM

## 2019-12-18 DIAGNOSIS — R59.1 LYMPHADENOPATHY OF HEAD AND NECK: Primary | ICD-10-CM

## 2019-12-18 DIAGNOSIS — R59.1 LYMPHADENOPATHY OF HEAD AND NECK: ICD-10-CM

## 2019-12-18 DIAGNOSIS — Z98.890 POSTOPERATIVE STATE: ICD-10-CM

## 2019-12-18 DIAGNOSIS — R22.1 MASS OF LEFT SIDE OF NECK: ICD-10-CM

## 2019-12-18 LAB
CRP SERPL-MCNC: 4.1 MG/L (ref 0–8)
ERYTHROCYTE [SEDIMENTATION RATE] IN BLOOD BY WESTERGREN METHOD: 9 MM/H (ref 0–20)

## 2019-12-18 PROCEDURE — 85652 RBC SED RATE AUTOMATED: CPT | Performed by: OTOLARYNGOLOGY

## 2019-12-18 PROCEDURE — 36415 COLL VENOUS BLD VENIPUNCTURE: CPT | Performed by: OTOLARYNGOLOGY

## 2019-12-18 PROCEDURE — 99000 SPECIMEN HANDLING OFFICE-LAB: CPT | Performed by: OTOLARYNGOLOGY

## 2019-12-18 PROCEDURE — 86038 ANTINUCLEAR ANTIBODIES: CPT | Performed by: OTOLARYNGOLOGY

## 2019-12-18 PROCEDURE — 99024 POSTOP FOLLOW-UP VISIT: CPT | Performed by: OTOLARYNGOLOGY

## 2019-12-18 PROCEDURE — 86431 RHEUMATOID FACTOR QUANT: CPT | Performed by: OTOLARYNGOLOGY

## 2019-12-18 PROCEDURE — 86622 BRUCELLA ANTIBODY: CPT | Mod: 90 | Performed by: OTOLARYNGOLOGY

## 2019-12-18 PROCEDURE — 86611 BARTONELLA ANTIBODY: CPT | Mod: 90 | Performed by: OTOLARYNGOLOGY

## 2019-12-18 PROCEDURE — 86140 C-REACTIVE PROTEIN: CPT | Performed by: OTOLARYNGOLOGY

## 2019-12-18 ASSESSMENT — MIFFLIN-ST. JEOR: SCORE: 1999.77

## 2019-12-18 NOTE — PATIENT INSTRUCTIONS
Per physician instructions      If you have questions or concerns on any instructions given to you by your provider today or if you need to schedule an appointment, you can reach us at [phone].

## 2019-12-18 NOTE — NURSING NOTE
"Initial /72 (BP Location: Right arm, Patient Position: Sitting, Cuff Size: Adult Regular)   Pulse 62   Temp 97.6  F (36.4  C) (Oral)   Ht 1.88 m (6' 2\")   Wt 107.5 kg (237 lb)   BMI 30.43 kg/m   Estimated body mass index is 30.43 kg/m  as calculated from the following:    Height as of this encounter: 1.88 m (6' 2\").    Weight as of this encounter: 107.5 kg (237 lb). .    Mary Ann Clements CMA    "

## 2019-12-18 NOTE — TELEPHONE ENCOUNTER
Spoke to patient and he would like to know if he can have his fluocinonide without being seen. Pt lov 3/2018 and he stated he was seen so much for this in the past. Please review and advise.  Swapna TERRY RN BSN PHN  Specialty Clinics

## 2019-12-18 NOTE — LETTER
12/18/2019         RE: Leobardo Pace  [address] [place] [address]        Dear Colleague,    Thank you for referring your patient, Leobardo Pace, to the [facility]. Please see a copy of my visit note below.    Chief Complaint   Patient presents with     Post-op Visit     Post op left neck mass 11-29-19     History of Present Illness  Leobardo Pace is a 49 year old male who presents today for surgical follow-up.  The patient went to the operating room on 11/29/2019 for excision multiple left preauricular chain lymph nodes it appeared very abnormal on imaging and physical exam.  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  There is no signs of lymphoma or atypical proliferation on flow cytometry.  I spoke with hematology and the pathologist.  Discussed the condition known in the literature called progressive transformation of germinal centers.  There is some link between this entity and Hodgkin's lymphoma.  Usually they recommend close surveillance to watch for signs of Hodgkin's lymphoma.    Since surgery, the patient reports a significant decrease in his neck swelling.  The incision is healing well.  No problems with incisional pain or drainage.  No redness or swelling.    Past Medical History  Patient Active Problem List   Diagnosis     Lymphadenopathy of head and neck     Mass of left side of neck     Current Medications  No current outpatient medications on file.    Allergies  Allergies   Allergen Reactions     Fluconazole      Urticaria after 1st dosage.        Social History  Social History     Socioeconomic History     Marital status:      Spouse name: Not on file     Number of children: Not on file     Years of education: Not on file     Highest education level: Not on file   Occupational History     Not on file   Social Needs     Financial resource strain: Not on file     Food insecurity:     Worry: Not on  "file     Inability: Not on file     Transportation needs:     Medical: Not on file     Non-medical: Not on file   Tobacco Use     Smoking status: Never Smoker     Smokeless tobacco: Never Used   Substance and Sexual Activity     Alcohol use: No     Frequency: Never     Drug use: No     Sexual activity: Never   Lifestyle     Physical activity:     Days per week: Not on file     Minutes per session: Not on file     Stress: Not on file   Relationships     Social connections:     Talks on phone: Not on file     Gets together: Not on file     Attends Catholic service: Not on file     Active member of club or organization: Not on file     Attends meetings of clubs or organizations: Not on file     Relationship status: Not on file     Intimate partner violence:     Fear of current or ex partner: Not on file     Emotionally abused: Not on file     Physically abused: Not on file     Forced sexual activity: Not on file   Other Topics Concern     Parent/sibling w/ CABG, MI or angioplasty before 65F 55M? Not Asked   Social History Narrative     Not on file       Family History  Family History   Family history unknown: Yes       Review of Systems  As per HPI and PMHx, otherwise 10 system review including the head and neck, constitutional, eyes, respiratory, GI, skin, neurologic, lymphatic, endocrine, and allergy systems is negative.    Physical Exam  /72 (BP Location: Right arm, Patient Position: Sitting, Cuff Size: Adult Regular)   Pulse 62   Temp 97.6  F (36.4  C) (Oral)   Ht 1.88 m (6' 2\")   Wt 107.5 kg (237 lb)   BMI 30.43 kg/m     GENERAL: Patient is a pleasant, cooperative 49 year old male in no acute distress.  HEAD: Normocephalic, atraumatic.  Hair and scalp are normal.  EYES: Pupils are equal, round, reactive to light and accommodation.  Extraocular movements are intact.  The sclera nonicteric without injection.  The extraocular structures are normal.  EARS: Normal shape and symmetry.  No tenderness when " palpating the mastoid or tragal areas bilaterally.  NOSE: Nares are patent.  Nasal mucosa is pink and moist.  Negative anterior rhinoscopy.  NECK: Supple, trachea is midline.  The left neck incision is clean, dry, intact.  No evidence of hematoma or seroma.  The patient has had an interval decrease in his obvious lymphadenopathy.  NEUROLOGIC: Cranial nerves II through XII are grossly intact.  Voice is strong.  Patient is House-Brackman I/VI bilaterally.  CARDIOVASCULAR: Extremities are warm and well-perfused.  No significant peripheral edema.  RESPIRATORY: Patient has nonlabored breathing without cough, wheeze, stridor.  PSYCHIATRIC: Patient is alert and oriented.  Mood and affect appear normal.  SKIN: Warm and dry.  No scalp, face, or neck lesions noted.    Assessment and Plan     ICD-10-CM    1. Lymphadenopathy of head and neck R59.1 US Head Neck Soft Tissue   2. Mass of left side of neck R22.1 US Head Neck Soft Tissue   3. Postoperative state Z98.890 US Head Neck Soft Tissue      It was my pleasure seeing Leobardo Pace today in clinic.    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  There is no signs of lymphoma or atypical proliferation on flow cytometry.    Based on review of the literature and discussing with both the pathologist, hematology, and radiation oncology, this is a benign lymph node process.  I will work him up for other infectious or inflammatory disorders with some blood work.  If that is all negative, I would recommend the patient return in 3 to 4 months for repeat examination with a neck ultrasound to reevaluate the lymph nodes.  The patient knows to contact me sooner if he is having new symptoms, increasing neck swelling, or other concerns.    Craig Sanchez MD  Department of Otolarygology-Head and Neck Surgery  [facility]       Again, thank you for allowing me to participate in the care of your patient.         Sincerely,        Craig Sanchez MD

## 2019-12-19 LAB
ANA SER QL IF: NEGATIVE
RHEUMATOID FACT SER NEPH-ACNC: 9 IU/ML (ref 0–20)

## 2019-12-19 RX ORDER — FLUOCINONIDE 0.5 MG/G
OINTMENT TOPICAL
Qty: 60 G | Refills: 1 | Status: SHIPPED | OUTPATIENT
Start: 2019-12-19 | End: 2022-02-01

## 2019-12-19 NOTE — TELEPHONE ENCOUNTER
Refill with ointment sent to pharmacy. Pt advised he would need appt for further refills. Pt verbalized understanding.  Swapna TERRY RN BSN PHN  [facility]

## 2019-12-20 LAB
B QUINTANA IGG TITR SER: NORMAL {TITER}
B QUINTANA IGM TITR SER: NORMAL {TITER}

## 2019-12-21 LAB — BRUCELLA AB TITR SER AGGL: NORMAL {TITER}

## 2019-12-27 ENCOUNTER — TELEPHONE (OUTPATIENT)
Dept: OTOLARYNGOLOGY | Facility: CLINIC | Age: 49
End: 2019-12-27

## 2019-12-27 NOTE — TELEPHONE ENCOUNTER
I spoke with the patient on the phone regarding the results of his blood work.  All of his rheumatologic work-up, inflammatory markers,, and Brucella/Bartonella antibodies were negative.  This is likely reactive follicular hyperplasia with progressively transformed germinal centers.  We will plan on seeing the patient back in March for repeat examination and ultrasound.  The patient knows to contact me with any problems or concerns.    Craig Sanchez MD  Department of Otolarygology-Head and Neck Surgery  [facility]

## 2020-03-13 NOTE — PROGRESS NOTES
Chief Complaint   Patient presents with     RECHECK     surgery for neck mass on 11/29/20  no concerns per patient     History of Present Illness  Leobardo Pace is a 50 year old male who presents today for follow-up.  The patient went to the operating room on 11/29/2019 for excision multiple left preauricular chain lymph nodes it appeared very abnormal on imaging and physical exam.  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  There is no signs of lymphoma or atypical proliferation on flow cytometry.  I spoke with hematology and the pathologist.  Discussed the condition known in the literature called progressive transformation of germinal centers.  There is some link between this entity and Hodgkin's lymphoma.  Usually they recommend close surveillance to watch for signs of Hodgkin's lymphoma.  I last saw the patient on 12/18/2019 for follow-up and he was healing well.  He returns today for follow-up.     Since seeing the patient, the patient reports a significant decrease in his neck swelling.  The incision is healing well.  No problems with incisional pain or drainage.  No redness or swelling.    I reviewed his ultrasound performed earlier today shows stable lymphadenopathy without any significant changes.    Past Medical History  Patient Active Problem List   Diagnosis     Lymphadenopathy of head and neck     Mass of left side of neck     Current Medications    Current Outpatient Medications:      fluocinonide (LIDEX) 0.05 % external ointment, Apply sparingly to affected area twice daily as needed.  Do not apply to face. (Patient not taking: Reported on 3/16/2020), Disp: 60 g, Rfl: 1    Allergies  Allergies   Allergen Reactions     Fluconazole      Urticaria after 1st dosage.        Social History  Social History     Socioeconomic History     Marital status:      Spouse name: Not on file     Number of children: Not on file     Years of  education: Not on file     Highest education level: Not on file   Occupational History     Not on file   Social Needs     Financial resource strain: Not on file     Food insecurity     Worry: Not on file     Inability: Not on file     Transportation needs     Medical: Not on file     Non-medical: Not on file   Tobacco Use     Smoking status: Never Smoker     Smokeless tobacco: Never Used   Substance and Sexual Activity     Alcohol use: No     Frequency: Never     Drug use: No     Sexual activity: Never   Lifestyle     Physical activity     Days per week: Not on file     Minutes per session: Not on file     Stress: Not on file   Relationships     Social connections     Talks on phone: Not on file     Gets together: Not on file     Attends Gnosticist service: Not on file     Active member of club or organization: Not on file     Attends meetings of clubs or organizations: Not on file     Relationship status: Not on file     Intimate partner violence     Fear of current or ex partner: Not on file     Emotionally abused: Not on file     Physically abused: Not on file     Forced sexual activity: Not on file   Other Topics Concern     Parent/sibling w/ CABG, MI or angioplasty before 65F 55M? Not Asked   Social History Narrative     Not on file       Family History  Family History   Adopted: Yes       Review of Systems  As per HPI and PMHx, otherwise 10 system review including the head and neck, constitutional, eyes, respiratory, GI, skin, neurologic, lymphatic, endocrine, and allergy systems is negative.    Physical Exam  /68 (BP Location: Right arm, Patient Position: Chair, Cuff Size: Adult Large)   Pulse 54   Temp 98.4  F (36.9  C) (Oral)   Wt 107.5 kg (237 lb)   BMI 30.43 kg/m    GENERAL: Patient is a pleasant, cooperative 50 year old male in no acute distress.  HEAD: Normocephalic, atraumatic.  Hair and scalp are normal.  EYES: Pupils are equal, round, reactive to light and accommodation.  Extraocular  movements are intact.  The sclera nonicteric without injection.  The extraocular structures are normal.  EARS: Normal shape and symmetry.  No tenderness when palpating the mastoid or tragal areas bilaterally.  NOSE: Nares are patent.  Nasal mucosa is pink and moist.  Negative anterior rhinoscopy.  NECK: Supple, trachea is midline.  The patient has a well hearing left neck incision.  No significant palpable lymphadenopathy or masses bilaterally.  NEUROLOGIC: Cranial nerves II through XII are grossly intact.  Voice is strong.  Patient is House-Brackman I/VI bilaterally.  CARDIOVASCULAR: Extremities are warm and well-perfused.  No significant peripheral edema.  RESPIRATORY: Patient has nonlabored breathing without cough, wheeze, stridor.  PSYCHIATRIC: Patient is alert and oriented.  Mood and affect appear normal.  SKIN: Warm and dry.  No scalp, face, or neck lesions noted.    Assessment and Plan     ICD-10-CM    1. Lymphadenopathy of head and neck  R59.1 US Head Neck Soft Tissue      It was my pleasure seeing Leobardo Pace today in clinic.  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  There is no signs of lymphoma or atypical proliferation on flow cytometry.     His physical exam is stable.  His ultrasound suggest stable lymphadenopathy.  At this point time, I would recommend observation.  We can recheck in 6 months with an ultrasound.  The patient knows to contact me sooner if he is having new symptoms or progression of his lymphadenopathy in the meantime.    Craig Sanchez MD  Department of Otolarygology-Head and Neck Surgery  [facility]

## 2020-03-16 ENCOUNTER — HOSPITAL ENCOUNTER (OUTPATIENT)
Dept: ULTRASOUND IMAGING | Facility: CLINIC | Age: 50
Discharge: HOME | End: 2020-03-16
Attending: OTOLARYNGOLOGY | Admitting: OTOLARYNGOLOGY
Payer: COMMERCIAL

## 2020-03-16 ENCOUNTER — OFFICE VISIT (OUTPATIENT)
Dept: OTOLARYNGOLOGY | Facility: CLINIC | Age: 50
End: 2020-03-16
Payer: COMMERCIAL

## 2020-03-16 VITALS
HEART RATE: 54 BPM | WEIGHT: 237 LBS | BODY MASS INDEX: 30.43 KG/M2 | DIASTOLIC BLOOD PRESSURE: 68 MMHG | TEMPERATURE: 98.4 F | SYSTOLIC BLOOD PRESSURE: 120 MMHG

## 2020-03-16 DIAGNOSIS — Z98.890 POSTOPERATIVE STATE: ICD-10-CM

## 2020-03-16 DIAGNOSIS — R22.1 MASS OF LEFT SIDE OF NECK: ICD-10-CM

## 2020-03-16 DIAGNOSIS — R59.1 LYMPHADENOPATHY OF HEAD AND NECK: Primary | ICD-10-CM

## 2020-03-16 DIAGNOSIS — R59.1 LYMPHADENOPATHY OF HEAD AND NECK: ICD-10-CM

## 2020-03-16 PROCEDURE — 76536 US EXAM OF HEAD AND NECK: CPT

## 2020-03-16 PROCEDURE — 99213 OFFICE O/P EST LOW 20 MIN: CPT | Performed by: OTOLARYNGOLOGY

## 2020-03-16 ASSESSMENT — PAIN SCALES - GENERAL: PAINLEVEL: NO PAIN (0)

## 2020-03-16 NOTE — NURSING NOTE
"Initial /68 (BP Location: Right arm, Patient Position: Chair, Cuff Size: Adult Large)   Pulse 54   Temp 98.4  F (36.9  C) (Oral)   Wt 107.5 kg (237 lb)   BMI 30.43 kg/m   Estimated body mass index is 30.43 kg/m  as calculated from the following:    Height as of 12/18/19: 1.88 m (6' 2\").    Weight as of this encounter: 107.5 kg (237 lb). .    Shana Bae LPN    "

## 2020-03-16 NOTE — LETTER
3/16/2020         RE: Leobardo Pace  [address] [place] [address]        Dear Colleague,    Thank you for referring your patient, Leobardo Pace, to the [facility]. Please see a copy of my visit note below.    Chief Complaint   Patient presents with     RECHECK     surgery for neck mass on 11/29/20  no concerns per patient     History of Present Illness  Leobardo Pace is a 50 year old male who presents today for follow-up.  The patient went to the operating room on 11/29/2019 for excision multiple left preauricular chain lymph nodes it appeared very abnormal on imaging and physical exam.  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  There is no signs of lymphoma or atypical proliferation on flow cytometry.  I spoke with hematology and the pathologist.  Discussed the condition known in the literature called progressive transformation of germinal centers.  There is some link between this entity and Hodgkin's lymphoma.  Usually they recommend close surveillance to watch for signs of Hodgkin's lymphoma.  I last saw the patient on 12/18/2019 for follow-up and he was healing well.  He returns today for follow-up.     Since seeing the patient, the patient reports a significant decrease in his neck swelling.  The incision is healing well.  No problems with incisional pain or drainage.  No redness or swelling.    I reviewed his ultrasound performed earlier today shows stable lymphadenopathy without any significant changes.    Past Medical History  Patient Active Problem List   Diagnosis     Lymphadenopathy of head and neck     Mass of left side of neck     Current Medications    Current Outpatient Medications:      fluocinonide (LIDEX) 0.05 % external ointment, Apply sparingly to affected area twice daily as needed.  Do not apply to face. (Patient not taking: Reported on 3/16/2020), Disp: 60 g, Rfl: 1    Allergies  Allergies    Allergen Reactions     Fluconazole      Urticaria after 1st dosage.        Social History  Social History     Socioeconomic History     Marital status:      Spouse name: Not on file     Number of children: Not on file     Years of education: Not on file     Highest education level: Not on file   Occupational History     Not on file   Social Needs     Financial resource strain: Not on file     Food insecurity     Worry: Not on file     Inability: Not on file     Transportation needs     Medical: Not on file     Non-medical: Not on file   Tobacco Use     Smoking status: Never Smoker     Smokeless tobacco: Never Used   Substance and Sexual Activity     Alcohol use: No     Frequency: Never     Drug use: No     Sexual activity: Never   Lifestyle     Physical activity     Days per week: Not on file     Minutes per session: Not on file     Stress: Not on file   Relationships     Social connections     Talks on phone: Not on file     Gets together: Not on file     Attends Mandaen service: Not on file     Active member of club or organization: Not on file     Attends meetings of clubs or organizations: Not on file     Relationship status: Not on file     Intimate partner violence     Fear of current or ex partner: Not on file     Emotionally abused: Not on file     Physically abused: Not on file     Forced sexual activity: Not on file   Other Topics Concern     Parent/sibling w/ CABG, MI or angioplasty before 65F 55M? Not Asked   Social History Narrative     Not on file       Family History  Family History   Adopted: Yes       Review of Systems  As per HPI and PMHx, otherwise 10 system review including the head and neck, constitutional, eyes, respiratory, GI, skin, neurologic, lymphatic, endocrine, and allergy systems is negative.    Physical Exam  /68 (BP Location: Right arm, Patient Position: Chair, Cuff Size: Adult Large)   Pulse 54   Temp 98.4  F (36.9  C) (Oral)   Wt 107.5 kg (237 lb)   BMI 30.43  kg/m    GENERAL: Patient is a pleasant, cooperative 50 year old male in no acute distress.  HEAD: Normocephalic, atraumatic.  Hair and scalp are normal.  EYES: Pupils are equal, round, reactive to light and accommodation.  Extraocular movements are intact.  The sclera nonicteric without injection.  The extraocular structures are normal.  EARS: Normal shape and symmetry.  No tenderness when palpating the mastoid or tragal areas bilaterally.  NOSE: Nares are patent.  Nasal mucosa is pink and moist.  Negative anterior rhinoscopy.  NECK: Supple, trachea is midline.  The patient has a well hearing left neck incision.  No significant palpable lymphadenopathy or masses bilaterally.  NEUROLOGIC: Cranial nerves II through XII are grossly intact.  Voice is strong.  Patient is House-Brackman I/VI bilaterally.  CARDIOVASCULAR: Extremities are warm and well-perfused.  No significant peripheral edema.  RESPIRATORY: Patient has nonlabored breathing without cough, wheeze, stridor.  PSYCHIATRIC: Patient is alert and oriented.  Mood and affect appear normal.  SKIN: Warm and dry.  No scalp, face, or neck lesions noted.    Assessment and Plan     ICD-10-CM    1. Lymphadenopathy of head and neck  R59.1 US Head Neck Soft Tissue      It was my pleasure seeing Leobardo Pace today in clinic.  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  There is no signs of lymphoma or atypical proliferation on flow cytometry.     His physical exam is stable.  His ultrasound suggest stable lymphadenopathy.  At this point time, I would recommend observation.  We can recheck in 6 months with an ultrasound.  The patient knows to contact me sooner if he is having new symptoms or progression of his lymphadenopathy in the meantime.    Craig Sanchez MD  Department of Otolarygology-Head and Neck Surgery  [facility]       Again, thank you for allowing me to participate in the  care of your patient.        Sincerely,        Craig Sanchez MD

## 2020-09-15 NOTE — PROGRESS NOTES
Chief Complaint   Patient presents with     Ent Problem     Follow up 6 month check- hx -11/29/2019 for excision multiple left preauricular chain lymph      History of Present Illness  Leobardo Pace is a 50 year old male who presents today for follow-up.  The patient went to the operating room on 11/29/2019 for excision multiple left preauricular chain lymph nodes it appeared very abnormal on imaging and physical exam.  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  There was no signs of lymphoma or atypical proliferation on flow cytometry.  I discussed the patient with hematology and the pathologist. We discussed the condition known in the literature called progressive transformation of germinal centers.  There is some link between this entity and Hodgkin's lymphoma.  Usually they recommend close surveillance to watch for signs of Hodgkin's lymphoma.  I last saw the patient on 3/16/2020 for follow-up and he was doing well.  He returns today for follow-up.     Since seeing the patient, the patient reports no new neck mass or lymphadenopathy.  He denies any fevers, chills, night sweats, unintentional weight loss.      I reviewed his ultrasound performed earlier today.  The largest lymph node measured were 1.6 cm, 2.1 cm, 1.8 cm.  These have actually all gone down in size since his March ultrasound.     Past Medical History  Patient Active Problem List   Diagnosis     Lymphadenopathy of head and neck     Mass of left side of neck     Current Medications    Current Outpatient Medications:      fluocinonide (LIDEX) 0.05 % external ointment, Apply sparingly to affected area twice daily as needed.  Do not apply to face. (Patient not taking: Reported on 3/16/2020), Disp: 60 g, Rfl: 1    Allergies  Allergies   Allergen Reactions     Fluconazole      Urticaria after 1st dosage.        Social History  Social History     Socioeconomic History     Marital status:  "     Spouse name: Not on file     Number of children: Not on file     Years of education: Not on file     Highest education level: Not on file   Occupational History     Not on file   Social Needs     Financial resource strain: Not on file     Food insecurity     Worry: Not on file     Inability: Not on file     Transportation needs     Medical: Not on file     Non-medical: Not on file   Tobacco Use     Smoking status: Never Smoker     Smokeless tobacco: Never Used   Substance and Sexual Activity     Alcohol use: No     Frequency: Never     Drug use: No     Sexual activity: Never   Lifestyle     Physical activity     Days per week: Not on file     Minutes per session: Not on file     Stress: Not on file   Relationships     Social connections     Talks on phone: Not on file     Gets together: Not on file     Attends Gnosticism service: Not on file     Active member of club or organization: Not on file     Attends meetings of clubs or organizations: Not on file     Relationship status: Not on file     Intimate partner violence     Fear of current or ex partner: Not on file     Emotionally abused: Not on file     Physically abused: Not on file     Forced sexual activity: Not on file   Other Topics Concern     Parent/sibling w/ CABG, MI or angioplasty before 65F 55M? Not Asked   Social History Narrative     Not on file       Family History  Family History   Adopted: Yes       Review of Systems  As per HPI and PMHx, otherwise 10 system review including the head and neck, constitutional, eyes, respiratory, GI, skin, neurologic, lymphatic, endocrine, and allergy systems is negative.    Physical Exam  /82 (BP Location: Right arm, Patient Position: Sitting, Cuff Size: Adult Large)   Pulse 61   Temp 97.5  F (36.4  C) (Tympanic)   Ht 1.88 m (6' 2\")   Wt 109.3 kg (241 lb)   BMI 30.94 kg/m    GENERAL: Patient is a pleasant, cooperative 50 year old male in no acute distress.  HEAD: Normocephalic, atraumatic.  " Hair and scalp are normal.  EYES: Pupils are equal, round, reactive to light and accommodation.  Extraocular movements are intact.  The sclera nonicteric without injection.  The extraocular structures are normal.  EARS: Normal shape and symmetry.  No tenderness when palpating the mastoid or tragal areas bilaterally.  NOSE: Nares are patent.  Nasal mucosa is pink and moist.  Negative anterior rhinoscopy.  NECK: Supple, trachea is midline.  The patient has a well hearing left neck incision.  No significant palpable lymphadenopathy or masses bilaterally.  NEUROLOGIC: Cranial nerves II through XII are grossly intact.  Voice is strong.  Patient is House-Brackmann I/VI bilaterally.  CARDIOVASCULAR: Extremities are warm and well-perfused.  No significant peripheral edema.  RESPIRATORY: Patient has nonlabored breathing without cough, wheeze, stridor.  PSYCHIATRIC: Patient is alert and oriented.  Mood and affect appear normal.  SKIN: Warm and dry.  No scalp, face, or neck lesions noted.    Assessment and Plan     ICD-10-CM    1. Lymphadenopathy of head and neck  R59.1 US Head Neck Soft Tissue      It was my pleasure seeing Leobardo Pace today in clinic.  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  There is no signs of lymphoma or atypical proliferation on flow cytometry.     His physical exam is stable.  His ultrasound suggest stable to improved lymphadenopathy.  At this point time, I would recommend observation.  We can recheck in 1 year with an ultrasound.  The patient knows to contact me sooner if he is having new symptoms or progression of his lymphadenopathy in the meantime.    Craig Sanchez MD  Department of Otolarygology-Head and Neck Surgery  [facility]

## 2020-09-16 ENCOUNTER — HOSPITAL ENCOUNTER (OUTPATIENT)
Dept: ULTRASOUND IMAGING | Facility: CLINIC | Age: 50
Discharge: HOME | End: 2020-09-16
Attending: OTOLARYNGOLOGY | Admitting: OTOLARYNGOLOGY
Payer: COMMERCIAL

## 2020-09-16 ENCOUNTER — OFFICE VISIT (OUTPATIENT)
Dept: OTOLARYNGOLOGY | Facility: CLINIC | Age: 50
End: 2020-09-16
Payer: COMMERCIAL

## 2020-09-16 VITALS
WEIGHT: 241 LBS | TEMPERATURE: 97.5 F | HEART RATE: 61 BPM | HEIGHT: 74 IN | DIASTOLIC BLOOD PRESSURE: 82 MMHG | BODY MASS INDEX: 30.93 KG/M2 | SYSTOLIC BLOOD PRESSURE: 120 MMHG

## 2020-09-16 DIAGNOSIS — R59.1 LYMPHADENOPATHY OF HEAD AND NECK: ICD-10-CM

## 2020-09-16 DIAGNOSIS — R59.1 LYMPHADENOPATHY OF HEAD AND NECK: Primary | ICD-10-CM

## 2020-09-16 PROCEDURE — 76536 US EXAM OF HEAD AND NECK: CPT

## 2020-09-16 PROCEDURE — 99213 OFFICE O/P EST LOW 20 MIN: CPT | Performed by: OTOLARYNGOLOGY

## 2020-09-16 ASSESSMENT — MIFFLIN-ST. JEOR: SCORE: 2012.92

## 2020-09-16 NOTE — NURSING NOTE
"Initial /82 (BP Location: Right arm, Patient Position: Sitting, Cuff Size: Adult Large)   Pulse 61   Temp 97.5  F (36.4  C) (Tympanic)   Ht 1.88 m (6' 2\")   Wt 109.3 kg (241 lb)   BMI 30.94 kg/m   Estimated body mass index is 30.94 kg/m  as calculated from the following:    Height as of this encounter: 1.88 m (6' 2\").    Weight as of this encounter: 109.3 kg (241 lb). .    Mary Ann Clements CMA    "

## 2020-09-16 NOTE — LETTER
9/16/2020         RE: Leobardo Pace  [address] [place] [address]        Dear Colleague,    Thank you for referring your patient, Leobardo Pace, to the [facility]. Please see a copy of my visit note below.    Chief Complaint   Patient presents with     Ent Problem     Follow up 6 month check- hx -11/29/2019 for excision multiple left preauricular chain lymph      History of Present Illness  Leobardo Pace is a 50 year old male who presents today for follow-up.  The patient went to the operating room on 11/29/2019 for excision multiple left preauricular chain lymph nodes it appeared very abnormal on imaging and physical exam.  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  There was no signs of lymphoma or atypical proliferation on flow cytometry.  I discussed the patient with hematology and the pathologist. We discussed the condition known in the literature called progressive transformation of germinal centers.  There is some link between this entity and Hodgkin's lymphoma.  Usually they recommend close surveillance to watch for signs of Hodgkin's lymphoma.  I last saw the patient on 3/16/2020 for follow-up and he was doing well.  He returns today for follow-up.     Since seeing the patient, the patient reports no new neck mass or lymphadenopathy.  He denies any fevers, chills, night sweats, unintentional weight loss.      I reviewed his ultrasound performed earlier today.  The largest lymph node measured were 1.6 cm, 2.1 cm, 1.8 cm.  These have actually all gone down in size since his March ultrasound.     Past Medical History  Patient Active Problem List   Diagnosis     Lymphadenopathy of head and neck     Mass of left side of neck     Current Medications    Current Outpatient Medications:      fluocinonide (LIDEX) 0.05 % external ointment, Apply sparingly to affected area twice daily as needed.  Do not apply to face.  (Patient not taking: Reported on 3/16/2020), Disp: 60 g, Rfl: 1    Allergies  Allergies   Allergen Reactions     Fluconazole      Urticaria after 1st dosage.        Social History  Social History     Socioeconomic History     Marital status:      Spouse name: Not on file     Number of children: Not on file     Years of education: Not on file     Highest education level: Not on file   Occupational History     Not on file   Social Needs     Financial resource strain: Not on file     Food insecurity     Worry: Not on file     Inability: Not on file     Transportation needs     Medical: Not on file     Non-medical: Not on file   Tobacco Use     Smoking status: Never Smoker     Smokeless tobacco: Never Used   Substance and Sexual Activity     Alcohol use: No     Frequency: Never     Drug use: No     Sexual activity: Never   Lifestyle     Physical activity     Days per week: Not on file     Minutes per session: Not on file     Stress: Not on file   Relationships     Social connections     Talks on phone: Not on file     Gets together: Not on file     Attends Roman Catholic service: Not on file     Active member of club or organization: Not on file     Attends meetings of clubs or organizations: Not on file     Relationship status: Not on file     Intimate partner violence     Fear of current or ex partner: Not on file     Emotionally abused: Not on file     Physically abused: Not on file     Forced sexual activity: Not on file   Other Topics Concern     Parent/sibling w/ CABG, MI or angioplasty before 65F 55M? Not Asked   Social History Narrative     Not on file       Family History  Family History   Adopted: Yes       Review of Systems  As per HPI and PMHx, otherwise 10 system review including the head and neck, constitutional, eyes, respiratory, GI, skin, neurologic, lymphatic, endocrine, and allergy systems is negative.    Physical Exam  /82 (BP Location: Right arm, Patient Position: Sitting, Cuff Size: Adult  "Large)   Pulse 61   Temp 97.5  F (36.4  C) (Tympanic)   Ht 1.88 m (6' 2\")   Wt 109.3 kg (241 lb)   BMI 30.94 kg/m    GENERAL: Patient is a pleasant, cooperative 50 year old male in no acute distress.  HEAD: Normocephalic, atraumatic.  Hair and scalp are normal.  EYES: Pupils are equal, round, reactive to light and accommodation.  Extraocular movements are intact.  The sclera nonicteric without injection.  The extraocular structures are normal.  EARS: Normal shape and symmetry.  No tenderness when palpating the mastoid or tragal areas bilaterally.  NOSE: Nares are patent.  Nasal mucosa is pink and moist.  Negative anterior rhinoscopy.  NECK: Supple, trachea is midline.  The patient has a well hearing left neck incision.  No significant palpable lymphadenopathy or masses bilaterally.  NEUROLOGIC: Cranial nerves II through XII are grossly intact.  Voice is strong.  Patient is House-Brackmann I/VI bilaterally.  CARDIOVASCULAR: Extremities are warm and well-perfused.  No significant peripheral edema.  RESPIRATORY: Patient has nonlabored breathing without cough, wheeze, stridor.  PSYCHIATRIC: Patient is alert and oriented.  Mood and affect appear normal.  SKIN: Warm and dry.  No scalp, face, or neck lesions noted.    Assessment and Plan     ICD-10-CM    1. Lymphadenopathy of head and neck  R59.1 US Head Neck Soft Tissue      It was my pleasure seeing Leobardo Pace today in clinic.  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  There is no signs of lymphoma or atypical proliferation on flow cytometry.     His physical exam is stable.  His ultrasound suggest stable to improved lymphadenopathy.  At this point time, I would recommend observation.  We can recheck in 1 year with an ultrasound.  The patient knows to contact me sooner if he is having new symptoms or progression of his lymphadenopathy in the meantime.    Craig Sanchez, " MD  Department of Otolarygology-Head and Neck Surgery  [facility]       Again, thank you for allowing me to participate in the care of your patient.        Sincerely,        Craig Sanchez MD

## 2020-09-16 NOTE — PATIENT INSTRUCTIONS
Per physician instructions      If you have questions or concerns on any instructions given to you by your provider today or if you need to schedule an appointment, you can reach us at [phone].

## 2022-02-01 ENCOUNTER — OFFICE VISIT (OUTPATIENT)
Dept: DERMATOLOGY | Facility: CLINIC | Age: 52
End: 2022-02-01
Payer: COMMERCIAL

## 2022-02-01 VITALS — SYSTOLIC BLOOD PRESSURE: 125 MMHG | HEART RATE: 57 BPM | OXYGEN SATURATION: 99 % | DIASTOLIC BLOOD PRESSURE: 80 MMHG

## 2022-02-01 DIAGNOSIS — L30.9 DERMATITIS: ICD-10-CM

## 2022-02-01 PROCEDURE — 99213 OFFICE O/P EST LOW 20 MIN: CPT | Performed by: PHYSICIAN ASSISTANT

## 2022-02-01 RX ORDER — FLUOCINONIDE 0.5 MG/G
OINTMENT TOPICAL
Qty: 60 G | Refills: 1 | Status: SHIPPED | OUTPATIENT
Start: 2022-02-01

## 2022-02-01 NOTE — PROGRESS NOTES
Leobardo Pace is an extremely pleasant 52 year old year old male patient here today for recheck rash. He notes he will get recurrent rash, biopsied in the past returned as spongiotic dermatitis with eos. He notes clear today, but if it flare he uses lidex for a few days.  Patient has no other skin complaints today.  Remainder of the HPI, Meds, PMH, Allergies, FH, and SH was reviewed in chart.    No past medical history on file.    Past Surgical History:   Procedure Laterality Date     EXCISE MASS NECK Left 11/29/2019    Procedure: EXCISION, MASS, NECK;  Surgeon: Craig Sanchez MD;  Location: [facility] OR     KNEE SURGERY Right 1990    removed excess cartilage        Family History   Adopted: Yes       Social History     Socioeconomic History     Marital status:      Spouse name: Not on file     Number of children: Not on file     Years of education: Not on file     Highest education level: Not on file   Occupational History     Not on file   Tobacco Use     Smoking status: Never Smoker     Smokeless tobacco: Never Used   Substance and Sexual Activity     Alcohol use: No     Drug use: No     Sexual activity: Never   Other Topics Concern     Parent/sibling w/ CABG, MI or angioplasty before 65F 55M? Not Asked   Social History Narrative     Not on file     Social Determinants of Health     Financial Resource Strain: Not on file   Food Insecurity: Not on file   Transportation Needs: Not on file   Physical Activity: Not on file   Stress: Not on file   Social Connections: Not on file   Intimate Partner Violence: Not on file   Housing Stability: Not on file       Outpatient Encounter Medications as of 2/1/2022   Medication Sig Dispense Refill     fluocinonide (LIDEX) 0.05 % external ointment Apply sparingly to affected area twice daily as needed.  Do not apply to face. 60 g 1     [DISCONTINUED] fluocinonide (LIDEX) 0.05 % external ointment Apply sparingly to affected area twice daily as needed.  Do not apply to face. 60 g 1      No facility-administered encounter medications on file as of 2/1/2022.             O:   NAD, WDWN, Alert & Oriented, Mood & Affect wnl, Vitals stable   Here today alone   /80 (BP Location: Right arm, Patient Position: Sitting, Cuff Size: Adult Large)   Pulse 57   SpO2 99%    General appearance normal   Vitals stable   Alert, oriented and in no acute distress     No visible rash      Eyes: Conjunctivae/lids:Normal     ENT: Lip: normal    MSK:Normal    Pulm: Breathing Normal    Neuro/Psych: Orientation:Alert and Orientedx3 ; Mood/Affect:normal   A/P:  1. Dermatitis  Use lidex as needed.   Follow skin care.   Skin care regimen reviewed with patient: Eliminate harsh soaps, i.e. Dial, zest, irsih spring; Mild soaps such as Cetaphil or Dove sensitive skin, avoid hot or cold showers, aggressive use of emollients including vanicream, cetaphil or cerave discussed with patient.    Return to clinic as needed.

## 2022-02-01 NOTE — LETTER
2/1/2022         RE: Leobardo Pace  [address] [place] [address]        Dear Colleague,    Thank you for referring your patient, Leobardo Pace, to the [facility]. Please see a copy of my visit note below.    Leobardo Pace is an extremely pleasant 52 year old year old male patient here today for recheck rash. He notes he will get recurrent rash, biopsied in the past returned as spongiotic dermatitis with eos. He notes clear today, but if it flare he uses lidex for a few days.  Patient has no other skin complaints today.  Remainder of the HPI, Meds, PMH, Allergies, FH, and SH was reviewed in chart.    No past medical history on file.    Past Surgical History:   Procedure Laterality Date     EXCISE MASS NECK Left 11/29/2019    Procedure: EXCISION, MASS, NECK;  Surgeon: Craig Sanchez MD;  Location: [facility] OR     KNEE SURGERY Right 1990    removed excess cartilage        Family History   Adopted: Yes       Social History     Socioeconomic History     Marital status:      Spouse name: Not on file     Number of children: Not on file     Years of education: Not on file     Highest education level: Not on file   Occupational History     Not on file   Tobacco Use     Smoking status: Never Smoker     Smokeless tobacco: Never Used   Substance and Sexual Activity     Alcohol use: No     Drug use: No     Sexual activity: Never   Other Topics Concern     Parent/sibling w/ CABG, MI or angioplasty before 65F 55M? Not Asked   Social History Narrative     Not on file     Social Determinants of Health     Financial Resource Strain: Not on file   Food Insecurity: Not on file   Transportation Needs: Not on file   Physical Activity: Not on file   Stress: Not on file   Social Connections: Not on file   Intimate Partner Violence: Not on file   Housing Stability: Not on file       Outpatient Encounter Medications as of 2/1/2022   Medication Sig Dispense Refill     fluocinonide (LIDEX) 0.05 % external  ointment Apply sparingly to affected area twice daily as needed.  Do not apply to face. 60 g 1     [DISCONTINUED] fluocinonide (LIDEX) 0.05 % external ointment Apply sparingly to affected area twice daily as needed.  Do not apply to face. 60 g 1     No facility-administered encounter medications on file as of 2/1/2022.             O:   NAD, WDWN, Alert & Oriented, Mood & Affect wnl, Vitals stable   Here today alone   /80 (BP Location: Right arm, Patient Position: Sitting, Cuff Size: Adult Large)   Pulse 57   SpO2 99%    General appearance normal   Vitals stable   Alert, oriented and in no acute distress     No visible rash      Eyes: Conjunctivae/lids:Normal     ENT: Lip: normal    MSK:Normal    Pulm: Breathing Normal    Neuro/Psych: Orientation:Alert and Orientedx3 ; Mood/Affect:normal   A/P:  1. Dermatitis  Use lidex as needed.   Follow skin care.   Skin care regimen reviewed with patient: Eliminate harsh soaps, i.e. Dial, zest, irsih spring; Mild soaps such as Cetaphil or Dove sensitive skin, avoid hot or cold showers, aggressive use of emollients including vanicream, cetaphil or cerave discussed with patient.    Return to clinic as needed.       Again, thank you for allowing me to participate in the care of your patient.        Sincerely,        Miesha Thomas PA-C

## 2022-02-01 NOTE — NURSING NOTE
Chief Complaint   Patient presents with     Derm Problem     want medication refill       Vitals:    02/01/22 1106   BP: 125/80   BP Location: Right arm   Patient Position: Sitting   Cuff Size: Adult Large   Pulse: 57   SpO2: 99%     Wt Readings from Last 1 Encounters:   09/16/20 109.3 kg (241 lb)       Liz Maurer LPN .................2/1/2022

## 2022-12-24 ENCOUNTER — HOSPITAL ENCOUNTER (EMERGENCY)
Facility: CLINIC | Age: 52
Discharge: HOME | End: 2022-12-24
Attending: EMERGENCY MEDICINE | Admitting: EMERGENCY MEDICINE
Payer: COMMERCIAL

## 2022-12-24 VITALS
OXYGEN SATURATION: 98 % | HEART RATE: 65 BPM | DIASTOLIC BLOOD PRESSURE: 91 MMHG | BODY MASS INDEX: 30.8 KG/M2 | SYSTOLIC BLOOD PRESSURE: 136 MMHG | RESPIRATION RATE: 18 BRPM | TEMPERATURE: 97.4 F | HEIGHT: 74 IN | WEIGHT: 240 LBS

## 2022-12-24 DIAGNOSIS — L01.00 IMPETIGO: ICD-10-CM

## 2022-12-24 DIAGNOSIS — L25.3 CONTACT DERMATITIS DUE TO OTHER CHEMICAL PRODUCT, UNSPECIFIED CONTACT DERMATITIS TYPE: ICD-10-CM

## 2022-12-24 PROCEDURE — 99283 EMERGENCY DEPT VISIT LOW MDM: CPT | Performed by: EMERGENCY MEDICINE

## 2022-12-24 PROCEDURE — 99284 EMERGENCY DEPT VISIT MOD MDM: CPT | Performed by: EMERGENCY MEDICINE

## 2022-12-24 RX ORDER — PREDNISONE 20 MG/1
TABLET ORAL
Qty: 24 TABLET | Refills: 0 | Status: SHIPPED | OUTPATIENT
Start: 2022-12-24

## 2022-12-24 RX ORDER — CEPHALEXIN 500 MG/1
500 CAPSULE ORAL 4 TIMES DAILY
Qty: 40 CAPSULE | Refills: 0 | Status: SHIPPED | OUTPATIENT
Start: 2022-12-24 | End: 2023-01-03

## 2022-12-24 ASSESSMENT — ACTIVITIES OF DAILY LIVING (ADL): ADLS_ACUITY_SCORE: 35

## 2022-12-24 NOTE — ED TRIAGE NOTES
Pt here with BLE rash that started 4 days ago after using veternary liniment gel that he has used before. The rash is getting worse and weeping through his jeans on the back side of both knees. Denied fever.      Triage Assessment     Row Name 12/24/22 1028       Triage Assessment (Adult)    Airway WDL WDL       Respiratory WDL    Respiratory WDL WDL       Skin Circulation/Temperature WDL    Skin Circulation/Temperature WDL X       Cardiac WDL    Cardiac WDL WDL       Peripheral/Neurovascular WDL    Peripheral Neurovascular WDL WDL       Cognitive/Neuro/Behavioral WDL    Cognitive/Neuro/Behavioral WDL WDL

## 2022-12-24 NOTE — ED PROVIDER NOTES
History     Chief Complaint   Patient presents with     Rash     HPI  Leobardo Pace is a 52 year old male who presents emergency department for evaluation of bilateral lower extremity rash and serous weeping attributed to a veterinary lotion he has been using his legs for leg stiffness and soreness, most recently used 4 days ago. He reports the veterinary lotion works like icy hot, and he also used this lotion on his neck, without rash there.  Since last using the lotion he has developed redness, inflammation, burning and itching discoloration of the posterior legs and then most recently in the past 24 hours serous colored weeping and crusting from the excoriated areas in the popliteal fossa areas. He reports he initially felt that he had some rash in the area of the knees, similar to prior benign itchy rash in these areas and used the veterinary liniment gel, as well as previously prescribed fluocinonide ointment from a Dermatologist rx, and OTC antibiotic ointment with no relief. He reports a similar rash in similar area approximately 10 years ago with Dermatology evaluation and no clear diagnosis.  He reports he gets similar rashes from contact dermatitis such as from contact with poison ivy or poison sumac.  No other known exposures in the area of rash, no other known potential allergens.  No fever or chills.  No leg swelling.  No other acute systemic signs, symptoms or complaints.  I examined and reviewed the Veterinary Liniment Gel which he brought with him, he uses it intermittently for similar symptoms and the container and gel is many years old. He has used this many times in the past and it contains spearmint scent, menthol 4%, SD alcohol 53%.     No other pertinent history or acute complaint or concerns.     Allergies:  Allergies   Allergen Reactions     Fluconazole      Urticaria after 1st dosage.        Problem List:    Patient Active Problem List    Diagnosis Date Noted     Lymphadenopathy of head  "and neck 11/12/2019     Priority: Medium     Added automatically from request for surgery [id number]       Mass of left side of neck 11/12/2019     Priority: Medium     Added automatically from request for surgery [id number]          Past Medical History:    History reviewed. No pertinent past medical history.    Past Surgical History:    Past Surgical History:   Procedure Laterality Date     EXCISE MASS NECK Left 11/29/2019    Procedure: EXCISION, MASS, NECK;  Surgeon: Craig Sanchez MD;  Location: [facility] OR     KNEE SURGERY Right 1990    removed excess cartilage       Family History:    Family History   Adopted: Yes       Social History:  Marital Status:   [2]  Social History     Tobacco Use     Smoking status: Never     Smokeless tobacco: Never   Substance Use Topics     Alcohol use: No     Drug use: No        Medications:    cephALEXin (KEFLEX) 500 MG capsule  predniSONE (DELTASONE) 20 MG tablet  fluocinonide (LIDEX) 0.05 % external ointment      Review of Systems   Constitutional: Negative.    Respiratory: Negative.    Skin: Positive for color change and rash.   Neurological: Negative.        Physical Exam   BP: (!) 136/91  Pulse: 65  Temp: 97.4  F (36.3  C)  Resp: 18  Height: 188 cm (6' 2\")  Weight: 108.9 kg (240 lb)  SpO2: 98 %      Physical Exam  Vitals and nursing note reviewed.   Constitutional:       Appearance: Normal appearance. He is not ill-appearing or toxic-appearing.   Cardiovascular:      Rate and Rhythm: Normal rate and regular rhythm.      Pulses: Normal pulses.   Pulmonary:      Effort: Pulmonary effort is normal. No respiratory distress.   Musculoskeletal:      Right lower leg: No edema.      Left lower leg: No edema.   Skin:     General: Skin is warm.      Findings: Erythema and rash present.      Comments: Bilateral posterior mid leg erythematous inflammation which is not bright red, hot or cellulitic appearing and is consistent with contact dermatitis.  Bilateral popliteal fossa excoriation, " serous weeping and honey crust consistent with impetigo.  No other lower extremity abnormality or lymphangitis or evidence of DVT or neurovascular abnormality/compromise.   Neurological:      General: No focal deficit present.      Mental Status: He is alert and oriented to person, place, and time.      Sensory: No sensory deficit.      Motor: No weakness.   Psychiatric:         Mood and Affect: Mood normal.         Behavior: Behavior normal.             ED Course                 Procedures                No results found for this or any previous visit (from the past 24 hour(s)).    Medications - No data to display    Assessments & Plan (with Medical Decision Making)   52 year old male with bilateral posterior mid lower extremity rash and serous weeping attributed to a veterinary lotion he has been using his legs for leg stiffness and soreness, most recently used 4 days ago. He reports the veterinary lotion works like icy hot, and he also used this lotion on his neck, without rash there. He reports he initially felt that he had some rash in the area of the knees, similar to prior benign itchy rash in these areas and used the veterinary liniment gel, as well as previously prescribed fluocinonide ointment from a Dermatologist rx, and OTC antibiotic ointment with no relief. He reports a similar rash in similar area approximately 10 years ago with Dermatology evaluation and no clear diagnosis.  He gets similar rashes from contact dermatitis such as from contact with poison ivy or poison sumac.  No other known exposures in the area of rash, no other known potential allergens. I examined and reviewed the Veterinary Liniment Gel which he brought with him, he uses it intermittently for similar symptoms and the container and gel is many years old. He has used this many times in the past and it contains spearmint scent, menthol 4%, SD alcohol 53%.     He appears to have a contact dermatitis from the gel with secondary  impetigo in the popliteal fossa areas.  I will prescribe an extended 12-day Prednisone burst and taper, Keflex and have him continue to use the topical antibiotic ointment.  The bilateral popliteal fossa areas with impetigo were cleansed at dressed with antibiotic ointment and bandages.  I will refer him to Dermatology, he return if his condition is worsening in any way and avoid use of the veterinary liniment gel in the future.    I have reviewed the nursing notes.    I have reviewed the findings, diagnosis, plan and need for follow up with the patient.    Discharge Medication List as of 12/24/2022 12:19 PM          Final diagnoses:   Contact dermatitis due to other chemical product, unspecified contact dermatitis type   Impetigo       12/24/2022   [facility] EMERGENCY DEPT            Guy Cano MD  12/28/22 0138

## 2022-12-24 NOTE — ED NOTES
Pt states that the veternary lotion he uses works like icy hot and has been applying it to his neck for soreness/stiffness. 4 days ago, pt applied this lotion to BLE for stiffness and since then rash has developed, spreading along inner sides of thighs. Bilateral thighs are swollen, reddened and inflamed, and pt reports burning and itching sensation. This morning, he noted weeping from back of L leg behind knee; peeling skin behind L knee also noted.

## 2022-12-28 ASSESSMENT — ENCOUNTER SYMPTOMS
CONSTITUTIONAL NEGATIVE: 1
RESPIRATORY NEGATIVE: 1
NEUROLOGICAL NEGATIVE: 1
COLOR CHANGE: 1

## 2023-01-10 ENCOUNTER — OFFICE VISIT (OUTPATIENT)
Dept: FAMILY MEDICINE | Facility: CLINIC | Age: 53
End: 2023-01-10
Payer: COMMERCIAL

## 2023-01-10 VITALS
HEIGHT: 74 IN | HEART RATE: 80 BPM | OXYGEN SATURATION: 97 % | SYSTOLIC BLOOD PRESSURE: 140 MMHG | DIASTOLIC BLOOD PRESSURE: 82 MMHG | BODY MASS INDEX: 30.8 KG/M2 | WEIGHT: 240 LBS | TEMPERATURE: 98 F

## 2023-01-10 DIAGNOSIS — L23.9 ALLERGIC DERMATITIS: Primary | ICD-10-CM

## 2023-01-10 PROCEDURE — 99203 OFFICE O/P NEW LOW 30 MIN: CPT | Performed by: NURSE PRACTITIONER

## 2023-01-10 RX ORDER — CETIRIZINE HYDROCHLORIDE 10 MG/1
10 TABLET ORAL DAILY
Qty: 90 TABLET | Refills: 3 | Status: SHIPPED | OUTPATIENT
Start: 2023-01-10

## 2023-01-10 RX ORDER — TRIAMCINOLONE ACETONIDE 1 MG/G
CREAM TOPICAL 2 TIMES DAILY PRN
Qty: 453.6 G | Refills: 1 | Status: SHIPPED | OUTPATIENT
Start: 2023-01-10

## 2023-01-10 ASSESSMENT — PAIN SCALES - GENERAL: PAINLEVEL: NO PAIN (0)

## 2023-01-10 NOTE — PROGRESS NOTES
Assessment & Plan     Allergic dermatitis  Discussed removing any topical causes and changing to sensitive skin detergent, fabric softener and dryer sheets.  Also advised against any cosmetic products that include propylene glycol since this is a common cause of skin breakout.  Starting zyrtec 10 mg daily for allergy reaction on the skin.  Also filled kenalog cream to apply to itchy areas to reduce urge to itch to reduce skin infection occurrence.  I recommend follow-up if not improving.  H  - cetirizine (ZYRTEC) 10 MG tablet; Take 1 tablet (10 mg) by mouth daily  - triamcinolone (KENALOG) 0.1 % external cream; Apply topically 2 times daily as needed for irritation    See Patient Instructions    Return if symptoms worsen or fail to improve.    Melissa Yun NP  [facility] CenterVLADIMIR Booth is a 52 year old, presenting for the following health issues:  Derm Problem (Neck to belly button )  behind knees itch once in awhile     HPI     Rash  Onset/Duration: 12/24/2022   Description  Location: 12/24/2022 rash on legs ,then started moving to chest and forearms   Character: blotchy, raised, painful, burning, red  Itching: severe  Intensity:  severe  Progression of Symptoms:  improving  Accompanying signs and symptoms:   Fever: No  Body aches or joint pain: No  Sore throat symptoms: No  Recent cold symptoms: No  History:           Previous episodes of similar rash: None  New exposures:  None  Recent travel: No  Exposure to similar rash: No  Precipitating or alleviating factors: none  Therapies tried and outcome: hydrocortisone cream -  Somewhat effective    Review of Systems   CONSTITUTIONAL: NEGATIVE for fever, chills, change in weight  INTEGUMENTARY/SKIN: POSITIVE for red rash as per HPI that is itchy  RESP: NEGATIVE for significant cough or SOB  CV: NEGATIVE for chest pain, palpitations or peripheral edema  PSYCHIATRIC: NEGATIVE for changes in mood or affect  ROS otherwise negative     "  Objective    BP (!) 140/82   Pulse 80   Temp 98  F (36.7  C) (Tympanic)   Ht 1.88 m (6' 2\")   Wt 108.9 kg (240 lb)   SpO2 97%   BMI 30.81 kg/m    Body mass index is 30.81 kg/m .  Physical Exam   GENERAL: healthy, alert and no distress  RESP: lungs clear to auscultation - no rales, rhonchi or wheezes  CV: regular rate and rhythm, normal S1 S2, no S3 or S4, no murmur, click or rub, no peripheral edema and peripheral pulses strong  SKIN: patient has generalized rash on arms upper chest and upper back that is pink/red mildly raised in some areas but no signs or symptoms of infection.  This rash does not jesus with pressure  PSYCH: mentation appears normal, affect normal/bright      "

## 2023-01-10 NOTE — PATIENT INSTRUCTIONS
Change out your detergent to free an clear for sensitive skin.  Also dryer sheets and softener come in free and clear.  When choosing soap, body wash or shampoo/conditioner look to make sure that it does not have propylene glycol in it since this is likely cause of rash.  Take Zyrtec 10 mg 1 hour prior to bedtime everyday.  Use topical steroid to areas that are itching as needed twice daily.  Follow-up if no improvement.

## 2023-03-21 ENCOUNTER — TELEPHONE (OUTPATIENT)
Dept: FAMILY MEDICINE | Facility: CLINIC | Age: 53
End: 2023-03-21
Payer: COMMERCIAL

## 2023-03-21 NOTE — TELEPHONE ENCOUNTER
Patient Quality Outreach    Patient is due for the following:   Colon Cancer Screening    Next Steps:   pt to schedule    Type of outreach:    Sent letter.      Questions for provider review:    None     Ever Hernandez

## 2023-03-21 NOTE — LETTER
[facility]  [address]  [facility] [address]  Phone: [phone]    March 21, 2023    To  Leobardo Pace  [address] [place] [address]    Your team at [facility] cares about your health. We have reviewed your chart and based on our findings; we are making the following recommendations to better manage your health.     You are in particular need of attention regarding the following:     Call or MyChart message your clinic to schedule a colonoscopy, schedule/ a FIT Test, or order a Cologuard test. If you are unsure what type of test you need, please call your clinic and speak to clinic staff.   Colon cancer is now the second leading cause of cancer-related deaths in the [place] for both men and women and there are over 130,000 new cases and 50,000 deaths per year from colon cancer. Colonoscopies can prevent 90-95% of these deaths. Problem lesions can be removed before they ever become cancer. This test is not only looking for cancer, but also getting rid of precancerous lesions.   If you are under/uninsured, we recommend you contact the [employer] Program.[employer] is a free colorectal cancer screening program that provides colonoscopies for eligible under/uninsured [place] men and women. If you are interested in receiving a free colonoscopy, please call [employer] at t [phone] (mention code [employer]) to see if you're eligible. Please have them send us the results.   Schedule an office visit with your provider if you are interested in completing your colon cancer screening with a Cologuard test    If you have already completed these items, please contact the clinic via phone or   [employer] so your care team can review and update your records. Thank you for   choosing [facility] for your healthcare needs. For any questions,   concerns, or to schedule an appointment please contact our clinic.    Healthy  Regards,      Your [facility] Team

## 2025-04-22 ENCOUNTER — OFFICE VISIT (OUTPATIENT)
Dept: ORTHOPEDICS | Facility: CLINIC | Age: 55
End: 2025-04-22
Payer: COMMERCIAL

## 2025-04-22 ENCOUNTER — ANCILLARY PROCEDURE (OUTPATIENT)
Dept: GENERAL RADIOLOGY | Facility: CLINIC | Age: 55
End: 2025-04-22
Attending: FAMILY MEDICINE
Payer: COMMERCIAL

## 2025-04-22 VITALS — WEIGHT: 246 LBS | BODY MASS INDEX: 31.57 KG/M2 | HEIGHT: 74 IN

## 2025-04-22 DIAGNOSIS — R26.9 IMPAIRED GAIT: ICD-10-CM

## 2025-04-22 DIAGNOSIS — M23.91 LIGAMENTOUS LAXITY OF RIGHT KNEE: ICD-10-CM

## 2025-04-22 DIAGNOSIS — M25.461 EFFUSION OF RIGHT KNEE: ICD-10-CM

## 2025-04-22 DIAGNOSIS — M17.11 PRIMARY OSTEOARTHRITIS OF RIGHT KNEE: ICD-10-CM

## 2025-04-22 DIAGNOSIS — G89.29 CHRONIC PAIN OF RIGHT KNEE: ICD-10-CM

## 2025-04-22 DIAGNOSIS — M25.561 CHRONIC PAIN OF RIGHT KNEE: ICD-10-CM

## 2025-04-22 DIAGNOSIS — M25.561 CHRONIC PAIN OF RIGHT KNEE: Primary | ICD-10-CM

## 2025-04-22 DIAGNOSIS — G89.29 CHRONIC PAIN OF RIGHT KNEE: Primary | ICD-10-CM

## 2025-04-22 PROCEDURE — 99204 OFFICE O/P NEW MOD 45 MIN: CPT | Mod: 25 | Performed by: FAMILY MEDICINE

## 2025-04-22 PROCEDURE — 20611 DRAIN/INJ JOINT/BURSA W/US: CPT | Mod: RT | Performed by: FAMILY MEDICINE

## 2025-04-22 PROCEDURE — 73562 X-RAY EXAM OF KNEE 3: CPT | Mod: TC | Performed by: RADIOLOGY

## 2025-04-22 RX ORDER — TRIAMCINOLONE ACETONIDE 40 MG/ML
40 INJECTION, SUSPENSION INTRA-ARTICULAR; INTRAMUSCULAR
Status: COMPLETED | OUTPATIENT
Start: 2025-04-22 | End: 2025-04-22

## 2025-04-22 RX ORDER — ROPIVACAINE HYDROCHLORIDE 5 MG/ML
3 INJECTION, SOLUTION EPIDURAL; INFILTRATION; PERINEURAL
Status: COMPLETED | OUTPATIENT
Start: 2025-04-22 | End: 2025-04-22

## 2025-04-22 RX ADMIN — ROPIVACAINE HYDROCHLORIDE 3 ML: 5 INJECTION, SOLUTION EPIDURAL; INFILTRATION; PERINEURAL at 09:00

## 2025-04-22 RX ADMIN — TRIAMCINOLONE ACETONIDE 40 MG: 40 INJECTION, SUSPENSION INTRA-ARTICULAR; INTRAMUSCULAR at 09:00

## 2025-04-22 NOTE — LETTER
"2025      Leobardo Pace  [address] [place] [address]      Dear Colleague,    Thank you for referring your patient, Leobardo Pace, to the [facility] [place]. Please see a copy of my visit note below.    Leobardo Pace  :  1970  DOS: 2025  MRN: [id number]    Sports Medicine Clinic Visit    PCP: No Ref-Primary, Physician    Leobardo Pace is a 55 year old male who is seen as a self referral presenting with chronic right knee pain.    Injury: Gradual onset of pain over the past 5+ years that is been worsening over the past 2 - 3 months.  Pain located over right deep anterior knee, nonradiating.  Additional Features:  Positive: swelling, grinding, and joint stiffness.  Symptoms are better with moving, activity modification.  Symptoms are worse with: prolonged driving/sitting, going from sit to stand, bending knee, stairs.  Other evaluation and/or treatments so far consists of: Ice, Heat, Ibuprofen, and Rest.  Recent imaging completed: No recent imaging completed.  Prior History of related problems: history of status post right knee arthroscopy ~ 30 years ago.    Social History: currently employed as     Review of Systems  Musculoskeletal: as above  Remainder of review of systems is negative including constitutional, CV, pulmonary, GI, Skin and Neurologic except as noted in HPI or medical history.    No past medical history on file.  Past Surgical History:   Procedure Laterality Date     EXCISE MASS NECK Left 2019    Procedure: EXCISION, MASS, NECK;  Surgeon: Craig Sanchez MD;  Location: [facility] OR     KNEE SURGERY Right     removed excess cartilage     Family History   Adopted: Yes       Objective  Ht 1.88 m (6' 2\")   Wt 111.6 kg (246 lb)   BMI 31.58 kg/m      General: healthy, alert and in no distress    HEENT: no scleral icterus or conjunctival erythema   Skin: no suspicious lesions or rash. No jaundice.   CV: regular rhythm by " palpation, 2+ distal pulses, no pedal edema    Resp: normal respiratory effort without conversational dyspnea   Psych: normal mood and affect    Gait: antalgic, appropriate coordination and balance   Neuro: normal light touch sensory exam of the extremities. Motor strength as noted below     Right Knee exam    ROM:        Decreased terminal active and passive ROM with flexion and extension    Inspection:       no visible ecchymosis        effusion noted moderate/large, mild warmth, no redness or induration    Skin:       no visible deformities       well perfused       capillary refill brisk    Patellar Motion:        Normal patellar tracking noted through range of motion       Crepitus noted in the patellofemoral joint       Mild genu varus noted    Tender:        lateral patellar border       medial joint line       lateral joint line    Non Tender:         remainder of knee area    Special Tests:        neg (-) Sangeetha       neg (-) Lachman       neg (-) anterior drawer       neg (-) posterior drawer       neg (-) varus at 0 deg and 30 deg for pain, some laxity noted       neg (-) valgus at 0 deg and 30 deg    Evaluation of ipsilateral kinetic chain       normal strength with hip extension and abduction       Mild quad deactivation/atrophy on the right      Radiology  Recent Results (from the past 744 hours)   XR Knee Standing AP [place] Bilat Lat Right    Narrative    EXAM: XR KNEE STANDING AP SUNRISE BILAT LAT RIGHT  LOCATION: [facility] ORTHOPEDICS???  DATE: 4/22/2025    INDICATION:  Chronic pain of right knee, Chronic pain of right knee  COMPARISON: None.      Impression    IMPRESSION:   Degenerative narrowing of the medial compartment of both knees, greater on the right. Mild genu varus on the right. Chronic fragmentation of the anterior tibial tubercle on the right consistent with old Osgood-Schlatter's disease or enthesitis. Slight   osteophytic spurring along the margins of the patella  bilaterally. Small right knee joint effusion. There are calcifications posterior to the right knee which may lie within a popliteal cyst and could potentially communicate with the joint. These are   corticated and chronic. No evidence for acute fracture.       Large Joint Injection/Arthocentesis: R knee joint    Date/Time: 4/22/2025 9:00 AM    Performed by: Christopher Mckeon DO  Authorized by: Christopher Mckeon DO    Indications:  Pain and osteoarthritis  Needle Size:  21 G  Guidance: ultrasound    Approach:  Superolateral  Location:  Knee      Medications:  40 mg triamcinolone 40 MG/ML; 3 mL ROPivacaine 5 MG/ML  Aspirate amount (mL):  55  Aspirate:  Serous and yellow  Outcome:  Tolerated well, no immediate complications  Procedure discussed: discussed risks, benefits, and alternatives    Consent Given by:  Patient  Timeout: timeout called immediately prior to procedure    Prep: patient was prepped and draped in usual sterile fashion     Ultrasound images of procedure were permanently stored.       Assessment:  1. Chronic pain of right knee    2. Impaired gait    3. Primary osteoarthritis of right knee    4. Effusion of right knee    5. Ligamentous laxity of right knee        Plan:  Discussed the assessment with the patient.  Follow up: 3 weeks if not improved, otherwise as needed  Progressive knee pain over the last 5 years, significantly worsened last 3 months with stiffness, pain and altered gait  Significant DJD present on XR, and has a hx of arthroscopy remotely as well  Reviewed bracing options, assistive devices, activity modification and progressive increase in activity as tolerated and guided by pain  Reviewed options for potential steroid vs viscosupplementation injections and the possibility for future orthopedic referral prn  Reviewed safe and appropriate OTC medication choices, try tylenol first  Up to 3000mg daily of tylenol is generally safe, NSAID dosing and duration limitations  reviewed  Discussed nature of degenerative arthrosis of the knee.   Discussed symptom treatment with ice or heat, topical treatments, and rest if needed.   After review of the alternatives, relative risks, goals and limitations of corticosteroid injection, the patient elected to proceed with US guided right knee aspiration and CSI today  Orthotics referral for  brace trial available in the future if desired  Reviewed PT options and strength goals, declined PT for now  Oral Tylenol and topical Voltaren gel reviewed as safe OTC options, reviewed safe dosing strategies  RICE reviewed  Expectations and goals of CSI reviewed  Often 2-3 days for steroid effect, and can take up to two weeks for maximum effect  We discussed modified progressive pain-free activity as tolerated  Do not overuse in first two weeks if feeling better due to concern for vulnerability while steroid is working  Supportive care reviewed  All questions were answered today  Contact us with additional questions or concerns  Signs and sx of concern reviewed      Christopher Mckeon DO, CAQ  Sports Medicine Physician  [facility]              Disclaimer: This note consists of symbols derived from keyboarding, dictation and/or voice recognition software. As a result, there may be errors in the script that have gone undetected. Please consider this when interpreting information found in this chart.      Again, thank you for allowing me to participate in the care of your patient.        Sincerely,        Christopher Mckeon DO    Electronically signed

## 2025-04-22 NOTE — PROGRESS NOTES
"Leobardo Pace  :  1970  DOS: 2025  MRN: [id number]    Sports Medicine Clinic Visit    PCP: No Ref-Primary, Physician    Leobardo Pace is a 55 year old male who is seen as a self referral presenting with chronic right knee pain.    Injury: Gradual onset of pain over the past 5+ years that is been worsening over the past 2 - 3 months.  Pain located over right deep anterior knee, nonradiating.  Additional Features:  Positive: swelling, grinding, and joint stiffness.  Symptoms are better with moving, activity modification.  Symptoms are worse with: prolonged driving/sitting, going from sit to stand, bending knee, stairs.  Other evaluation and/or treatments so far consists of: Ice, Heat, Ibuprofen, and Rest.  Recent imaging completed: No recent imaging completed.  Prior History of related problems: history of status post right knee arthroscopy ~ 30 years ago.    Social History: currently employed as     Review of Systems  Musculoskeletal: as above  Remainder of review of systems is negative including constitutional, CV, pulmonary, GI, Skin and Neurologic except as noted in HPI or medical history.    No past medical history on file.  Past Surgical History:   Procedure Laterality Date    EXCISE MASS NECK Left 2019    Procedure: EXCISION, MASS, NECK;  Surgeon: Craig Sanchez MD;  Location: [facility] OR    KNEE SURGERY Right     removed excess cartilage     Family History   Adopted: Yes       Objective  Ht 1.88 m (6' 2\")   Wt 111.6 kg (246 lb)   BMI 31.58 kg/m      General: healthy, alert and in no distress    HEENT: no scleral icterus or conjunctival erythema   Skin: no suspicious lesions or rash. No jaundice.   CV: regular rhythm by palpation, 2+ distal pulses, no pedal edema    Resp: normal respiratory effort without conversational dyspnea   Psych: normal mood and affect    Gait: antalgic, appropriate coordination and balance   Neuro: normal light touch sensory exam of the " extremities. Motor strength as noted below     Right Knee exam    ROM:        Decreased terminal active and passive ROM with flexion and extension    Inspection:       no visible ecchymosis        effusion noted moderate/large, mild warmth, no redness or induration    Skin:       no visible deformities       well perfused       capillary refill brisk    Patellar Motion:        Normal patellar tracking noted through range of motion       Crepitus noted in the patellofemoral joint       Mild genu varus noted    Tender:        lateral patellar border       medial joint line       lateral joint line    Non Tender:         remainder of knee area    Special Tests:        neg (-) Sangeetha       neg (-) Lachman       neg (-) anterior drawer       neg (-) posterior drawer       neg (-) varus at 0 deg and 30 deg for pain, some laxity noted       neg (-) valgus at 0 deg and 30 deg    Evaluation of ipsilateral kinetic chain       normal strength with hip extension and abduction       Mild quad deactivation/atrophy on the right      Radiology  Recent Results (from the past 744 hours)   XR Knee Standing AP [place] Bilat Lat Right    Narrative    EXAM: XR KNEE STANDING AP [place] BILAT LAT RIGHT  LOCATION: [facility]???  DATE: 4/22/2025    INDICATION:  Chronic pain of right knee, Chronic pain of right knee  COMPARISON: None.      Impression    IMPRESSION:   Degenerative narrowing of the medial compartment of both knees, greater on the right. Mild genu varus on the right. Chronic fragmentation of the anterior tibial tubercle on the right consistent with old Osgood-Schlatter's disease or enthesitis. Slight   osteophytic spurring along the margins of the patella bilaterally. Small right knee joint effusion. There are calcifications posterior to the right knee which may lie within a popliteal cyst and could potentially communicate with the joint. These are   corticated and chronic. No evidence for acute fracture.        Large Joint Injection/Arthocentesis: R knee joint    Date/Time: 4/22/2025 9:00 AM    Performed by: Christopher Mckeon DO  Authorized by: Christopher Mckeon DO    Indications:  Pain and osteoarthritis  Needle Size:  21 G  Guidance: ultrasound    Approach:  Superolateral  Location:  Knee      Medications:  40 mg triamcinolone 40 MG/ML; 3 mL ROPivacaine 5 MG/ML  Aspirate amount (mL):  55  Aspirate:  Serous and yellow  Outcome:  Tolerated well, no immediate complications  Procedure discussed: discussed risks, benefits, and alternatives    Consent Given by:  Patient  Timeout: timeout called immediately prior to procedure    Prep: patient was prepped and draped in usual sterile fashion     Ultrasound images of procedure were permanently stored.       Assessment:  1. Chronic pain of right knee    2. Impaired gait    3. Primary osteoarthritis of right knee    4. Effusion of right knee    5. Ligamentous laxity of right knee        Plan:  Discussed the assessment with the patient.  Follow up: 3 weeks if not improved, otherwise as needed  Progressive knee pain over the last 5 years, significantly worsened last 3 months with stiffness, pain and altered gait  Significant DJD present on XR, and has a hx of arthroscopy remotely as well  Reviewed bracing options, assistive devices, activity modification and progressive increase in activity as tolerated and guided by pain  Reviewed options for potential steroid vs viscosupplementation injections and the possibility for future orthopedic referral prn  Reviewed safe and appropriate OTC medication choices, try tylenol first  Up to 3000mg daily of tylenol is generally safe, NSAID dosing and duration limitations reviewed  Discussed nature of degenerative arthrosis of the knee.   Discussed symptom treatment with ice or heat, topical treatments, and rest if needed.   After review of the alternatives, relative risks, goals and limitations of corticosteroid injection, the  patient elected to proceed with US guided right knee aspiration and CSI today  Orthotics referral for  brace trial available in the future if desired  Reviewed PT options and strength goals, declined PT for now  Oral Tylenol and topical Voltaren gel reviewed as safe OTC options, reviewed safe dosing strategies  RICE reviewed  Expectations and goals of CSI reviewed  Often 2-3 days for steroid effect, and can take up to two weeks for maximum effect  We discussed modified progressive pain-free activity as tolerated  Do not overuse in first two weeks if feeling better due to concern for vulnerability while steroid is working  Supportive care reviewed  All questions were answered today  Contact us with additional questions or concerns  Signs and sx of concern reviewed      Christopher Mckeon DO, SWAPNIL  Sports Medicine Physician  [facility]              Disclaimer: This note consists of symbols derived from keyboarding, dictation and/or voice recognition software. As a result, there may be errors in the script that have gone undetected. Please consider this when interpreting information found in this chart.

## 2025-06-27 ENCOUNTER — TELEPHONE (OUTPATIENT)
Dept: ORTHOPEDICS | Facility: CLINIC | Age: 55
End: 2025-06-27

## 2025-06-27 DIAGNOSIS — M17.11 PRIMARY OSTEOARTHRITIS OF RIGHT KNEE: Primary | ICD-10-CM

## 2025-07-15 ENCOUNTER — OFFICE VISIT (OUTPATIENT)
Dept: ORTHOPEDICS | Facility: CLINIC | Age: 55
End: 2025-07-15
Payer: COMMERCIAL

## 2025-07-15 DIAGNOSIS — R26.9 IMPAIRED GAIT: ICD-10-CM

## 2025-07-15 DIAGNOSIS — M25.461 EFFUSION OF RIGHT KNEE: ICD-10-CM

## 2025-07-15 DIAGNOSIS — G89.29 CHRONIC PAIN OF RIGHT KNEE: ICD-10-CM

## 2025-07-15 DIAGNOSIS — M23.91 LIGAMENTOUS LAXITY OF RIGHT KNEE: ICD-10-CM

## 2025-07-15 DIAGNOSIS — M17.11 PRIMARY OSTEOARTHRITIS OF RIGHT KNEE: Primary | ICD-10-CM

## 2025-07-15 DIAGNOSIS — M25.561 CHRONIC PAIN OF RIGHT KNEE: ICD-10-CM

## 2025-07-15 PROCEDURE — 20611 DRAIN/INJ JOINT/BURSA W/US: CPT | Mod: RT | Performed by: FAMILY MEDICINE

## 2025-07-15 PROCEDURE — 99213 OFFICE O/P EST LOW 20 MIN: CPT | Mod: 25 | Performed by: FAMILY MEDICINE

## 2025-07-15 NOTE — LETTER
7/15/2025      Leobardo Pace  [address] [place] [address]      Dear Colleague,    Thank you for referring your patient, Leobardo Pace, to the [facility] [place]. Please see a copy of my visit note below.    Leobardo Pace  :  1970  DOS: 07/15/25  MRN: [id number]    Sports Medicine Clinic Visit    PCP: No Ref-Primary, Physician    Leobardo Pace is a 55 year old male who is seen as a self referral presenting with chronic right knee pain.    Injury: Gradual onset of pain over the past 5+ years that is been worsening over the past 2 - 3 months.  Pain located over right deep anterior knee, nonradiating.  Additional Features:  Positive: swelling, grinding, and joint stiffness.  Symptoms are better with moving, activity modification.  Symptoms are worse with: prolonged driving/sitting, going from sit to stand, bending knee, stairs.  Other evaluation and/or treatments so far consists of: Ice, Heat, Ibuprofen, and Rest.  Recent imaging completed: No recent imaging completed.  Prior History of related problems: history of status post right knee arthroscopy ~ 30 years ago.    Social History: currently employed as     Interim History - Jul 15, 2025  Since last visit on 2025 patient has moderate-severe right knee pain & swelling over the past 3+ weeks.  Right knee aspiration & steroid injection completed on 2025 provided relief for ~ 4 - 6 weeks.  No new injury in the interim.    Review of Systems  Musculoskeletal: as above  Remainder of review of systems is negative including constitutional, CV, pulmonary, GI, Skin and Neurologic except as noted in HPI or medical history.    No past medical history on file.  Past Surgical History:   Procedure Laterality Date     EXCISE MASS NECK Left 2019    Procedure: EXCISION, MASS, NECK;  Surgeon: Craig Sanchez MD;  Location: [facility] OR     KNEE SURGERY Right     removed excess cartilage     Family  History   Adopted: Yes       Objective  There were no vitals taken for this visit.    General: healthy, alert and in no distress    HEENT: no scleral icterus or conjunctival erythema   Skin: no suspicious lesions or rash. No jaundice.   CV: regular rhythm by palpation, 2+ distal pulses, no pedal edema    Resp: normal respiratory effort without conversational dyspnea   Psych: normal mood and affect    Gait: antalgic, appropriate coordination and balance   Neuro: normal light touch sensory exam of the extremities. Motor strength as noted below     Right Knee exam    ROM:        Mildly decreased terminal active and passive ROM with flexion and extension    Inspection:       no visible ecchymosis       effusion noted small/moderate, no warmth, no redness or induration    Skin:       no visible deformities       well perfused       capillary refill brisk    Patellar Motion:        Normal patellar tracking noted through range of motion       Crepitus noted in the patellofemoral joint       Mild genu varus noted on the right    Tender:        lateral patellar border       medial joint line       lateral joint line    Non Tender:         remainder of knee area    Special Tests:        neg (-) Sangeetha       neg (-) varus at 0 deg and 30 deg for pain, some laxity noted       neg (-) valgus at 0 deg and 30 deg    Evaluation of ipsilateral kinetic chain       normal strength with hip extension and abduction       Mild quad deactivation/atrophy on the right      Radiology  Recent Results (from the past 744 hours)   XR Knee Standing AP [place] Bilat Lat Right    Narrative    EXAM: XR KNEE STANDING AP [place] BILAT LAT RIGHT  LOCATION: [facility]???  DATE: 4/22/2025    INDICATION:  Chronic pain of right knee, Chronic pain of right knee  COMPARISON: None.      Impression    IMPRESSION:   Degenerative narrowing of the medial compartment of both knees, greater on the right. Mild genu varus on the right. Chronic  fragmentation of the anterior tibial tubercle on the right consistent with old Osgood-Schlatter's disease or enthesitis. Slight   osteophytic spurring along the margins of the patella bilaterally. Small right knee joint effusion. There are calcifications posterior to the right knee which may lie within a popliteal cyst and could potentially communicate with the joint. These are   corticated and chronic. No evidence for acute fracture.       Large Joint Injection/Arthocentesis: R knee joint    Date/Time: 7/15/2025 8:29 AM    Performed by: Christopher Mckoen DO  Authorized by: Christopher Mckeon DO    Indications:  Osteoarthritis  Needle Size:  21 G  Guidance: ultrasound    Approach:  Superolateral  Location:  Knee      Medications:  48 mg hylan 48 MG/6ML  Aspirate amount (mL):  14  Aspirate:  Serous and yellow  Outcome:  Tolerated well, no immediate complications  Procedure discussed: discussed risks, benefits, and alternatives    Consent Given by:  Patient  Timeout: timeout called immediately prior to procedure    Prep: patient was prepped and draped in usual sterile fashion     Ultrasound images of procedure were permanently stored.      Assessment:  1. Primary osteoarthritis of right knee    2. Chronic pain of right knee    3. Ligamentous laxity of right knee    4. Impaired gait    5. Effusion of right knee        Plan:  Discussed the assessment with the patient.  Follow up: 1 month if not improved, otherwise as needed  Progressive knee pain over the last 5 years, significantly worsened last 6 months with stiffness, pain and altered gait  Significant DJD present on XR, and has a hx of arthroscopy remotely as well  Reviewed bracing options, assistive devices, activity modification and progressive increase in activity as tolerated and guided by pain  Reviewed options for potential steroid vs viscosupplementation injections and the possibility for future orthopedic referral prn  Reviewed safe and  appropriate OTC medication choices, try tylenol first  Up to 3000mg daily of tylenol is generally safe, NSAID dosing and duration limitations reviewed  Discussed nature of degenerative arthrosis of the knee.   Discussed symptom treatment with ice or heat, topical treatments, and rest if needed.   After review of the alternatives, relative risks, goals and limitations the patient elected to proceed with US guided right knee aspiration and SynviscOne injection today  Orthotics referral for  brace trial available in the future if desired, declined again today  He is seriously considering TKA option in the winter, offered orthopedic surgery referral anytime for discussion  Reviewed PT options and strength goals, declined PT for now  Oral Tylenol and topical Voltaren gel reviewed as safe OTC options, reviewed safe dosing strategies  RICE reviewed  Expectations and limitations of synvisc were reviewed in detail  Often 4-6 weeks before full effect may be noticed  Usually covered up to every 6 months by insurance, but does not need to be repeated unless pain returns, at which point we would re-evaluate  Potential use of CSI in future for flares of pain reviewed in detail  Encouraged modified progressive pain-free activity as tolerated  HEP and Supportive care reviewed  All questions were answered today  Contact us with additional questions or concerns  Signs and sx of concern reviewed      Christopher Mckeon DO, CAQ  Sports Medicine Physician  [facility]              Disclaimer: This note consists of symbols derived from keyboarding, dictation and/or voice recognition software. As a result, there may be errors in the script that have gone undetected. Please consider this when interpreting information found in this chart.    Again, thank you for allowing me to participate in the care of your patient.        Sincerely,        Christopher Mckeon DO    Electronically signed

## 2025-07-15 NOTE — PROGRESS NOTES
Leobardo Pace  :  1970  DOS: 07/15/25  MRN: [id number]    Sports Medicine Clinic Visit    PCP: No Ref-Primary, Physician    Leobardo Pace is a 55 year old male who is seen as a self referral presenting with chronic right knee pain.    Injury: Gradual onset of pain over the past 5+ years that is been worsening over the past 2 - 3 months.  Pain located over right deep anterior knee, nonradiating.  Additional Features:  Positive: swelling, grinding, and joint stiffness.  Symptoms are better with moving, activity modification.  Symptoms are worse with: prolonged driving/sitting, going from sit to stand, bending knee, stairs.  Other evaluation and/or treatments so far consists of: Ice, Heat, Ibuprofen, and Rest.  Recent imaging completed: No recent imaging completed.  Prior History of related problems: history of status post right knee arthroscopy ~ 30 years ago.    Social History: currently employed as     Interim History - Jul 15, 2025  Since last visit on 2025 patient has moderate-severe right knee pain & swelling over the past 3+ weeks.  Right knee aspiration & steroid injection completed on 2025 provided relief for ~ 4 - 6 weeks.  No new injury in the interim.    Review of Systems  Musculoskeletal: as above  Remainder of review of systems is negative including constitutional, CV, pulmonary, GI, Skin and Neurologic except as noted in HPI or medical history.    No past medical history on file.  Past Surgical History:   Procedure Laterality Date    EXCISE MASS NECK Left 2019    Procedure: EXCISION, MASS, NECK;  Surgeon: Craig Sanchez MD;  Location: [facility]    KNEE SURGERY Right     removed excess cartilage     Family History   Adopted: Yes       Objective  There were no vitals taken for this visit.    General: healthy, alert and in no distress    HEENT: no scleral icterus or conjunctival erythema   Skin: no suspicious lesions or rash. No jaundice.   CV: regular rhythm  by palpation, 2+ distal pulses, no pedal edema    Resp: normal respiratory effort without conversational dyspnea   Psych: normal mood and affect    Gait: antalgic, appropriate coordination and balance   Neuro: normal light touch sensory exam of the extremities. Motor strength as noted below     Right Knee exam    ROM:        Mildly decreased terminal active and passive ROM with flexion and extension    Inspection:       no visible ecchymosis       effusion noted small/moderate, no warmth, no redness or induration    Skin:       no visible deformities       well perfused       capillary refill brisk    Patellar Motion:        Normal patellar tracking noted through range of motion       Crepitus noted in the patellofemoral joint       Mild genu varus noted on the right    Tender:        lateral patellar border       medial joint line       lateral joint line    Non Tender:         remainder of knee area    Special Tests:        neg (-) Sangeetha       neg (-) varus at 0 deg and 30 deg for pain, some laxity noted       neg (-) valgus at 0 deg and 30 deg    Evaluation of ipsilateral kinetic chain       normal strength with hip extension and abduction       Mild quad deactivation/atrophy on the right      Radiology  Recent Results (from the past 744 hours)   XR Knee Standing AP [place] Bilat Lat Right    Narrative    EXAM: XR KNEE STANDING AP [place] BILAT LAT RIGHT  LOCATION: [facility]???  DATE: 4/22/2025    INDICATION:  Chronic pain of right knee, Chronic pain of right knee  COMPARISON: None.      Impression    IMPRESSION:   Degenerative narrowing of the medial compartment of both knees, greater on the right. Mild genu varus on the right. Chronic fragmentation of the anterior tibial tubercle on the right consistent with old Osgood-Schlatter's disease or enthesitis. Slight   osteophytic spurring along the margins of the patella bilaterally. Small right knee joint effusion. There are calcifications  posterior to the right knee which may lie within a popliteal cyst and could potentially communicate with the joint. These are   corticated and chronic. No evidence for acute fracture.       Large Joint Injection/Arthocentesis: R knee joint    Date/Time: 7/15/2025 8:29 AM    Performed by: Christopher Mckeon DO  Authorized by: Christopher Mckeon DO    Indications:  Osteoarthritis  Needle Size:  21 G  Guidance: ultrasound    Approach:  Superolateral  Location:  Knee      Medications:  48 mg hylan 48 MG/6ML  Aspirate amount (mL):  14  Aspirate:  Serous and yellow  Outcome:  Tolerated well, no immediate complications  Procedure discussed: discussed risks, benefits, and alternatives    Consent Given by:  Patient  Timeout: timeout called immediately prior to procedure    Prep: patient was prepped and draped in usual sterile fashion     Ultrasound images of procedure were permanently stored.      Assessment:  1. Primary osteoarthritis of right knee    2. Chronic pain of right knee    3. Ligamentous laxity of right knee    4. Impaired gait    5. Effusion of right knee        Plan:  Discussed the assessment with the patient.  Follow up: 1 month if not improved, otherwise as needed  Progressive knee pain over the last 5 years, significantly worsened last 6 months with stiffness, pain and altered gait  Significant DJD present on XR, and has a hx of arthroscopy remotely as well  Reviewed bracing options, assistive devices, activity modification and progressive increase in activity as tolerated and guided by pain  Reviewed options for potential steroid vs viscosupplementation injections and the possibility for future orthopedic referral prn  Reviewed safe and appropriate OTC medication choices, try tylenol first  Up to 3000mg daily of tylenol is generally safe, NSAID dosing and duration limitations reviewed  Discussed nature of degenerative arthrosis of the knee.   Discussed symptom treatment with ice or heat, topical  treatments, and rest if needed.   After review of the alternatives, relative risks, goals and limitations the patient elected to proceed with US guided right knee aspiration and SynviscOne injection today  Orthotics referral for  brace trial available in the future if desired, declined again today  He is seriously considering TKA option in the winter, offered orthopedic surgery referral anytime for discussion  Reviewed PT options and strength goals, declined PT for now  Oral Tylenol and topical Voltaren gel reviewed as safe OTC options, reviewed safe dosing strategies  RICE reviewed  Expectations and limitations of synvisc were reviewed in detail  Often 4-6 weeks before full effect may be noticed  Usually covered up to every 6 months by insurance, but does not need to be repeated unless pain returns, at which point we would re-evaluate  Potential use of CSI in future for flares of pain reviewed in detail  Encouraged modified progressive pain-free activity as tolerated  HEP and Supportive care reviewed  All questions were answered today  Contact us with additional questions or concerns  Signs and sx of concern reviewed      Christopher Mckeon DO, SWAPNIL  Sports Medicine Physician  [facility]              Disclaimer: This note consists of symbols derived from keyboarding, dictation and/or voice recognition software. As a result, there may be errors in the script that have gone undetected. Please consider this when interpreting information found in this chart.

## (undated) DEVICE — DRSG GAUZE 4X4" TRAY

## (undated) DEVICE — SOL NACL 0.9% IRRIG 1000ML BOTTLE 07138-09

## (undated) DEVICE — ESU CORD BIPOLAR GREEN 10-4000

## (undated) DEVICE — SU MONOCRYL 4-0 PS-2 18" UND Y496G

## (undated) DEVICE — SPONGE RAY-TEC 4X8" 7318

## (undated) DEVICE — NDL COUNTER 20CT 31142493

## (undated) DEVICE — SPONGE KITTNER 31001010

## (undated) DEVICE — SYR 50ML CATH TIP W/O NDL 309620

## (undated) DEVICE — PACK BASIC 9103

## (undated) DEVICE — SYR BULB IRRIG 50ML LATEX FREE 0035280

## (undated) DEVICE — DRAPE U SPLIT 74X120" 29440

## (undated) DEVICE — BASIN SET MINOR DISP

## (undated) DEVICE — PREP SKIN SCRUB TRAY 4461A

## (undated) DEVICE — ESU PENCIL W/COATED BLADE E2450H

## (undated) DEVICE — NDL 27GA 1.25" 305136

## (undated) DEVICE — DRSG TELFA 3X8" 1238

## (undated) DEVICE — DECANTER VIAL 2006S

## (undated) DEVICE — SYR 10ML FINGER CONTROL W/O NDL 309695

## (undated) DEVICE — SURGICEL ABSORBABLE HEMOSTAT SNOW 2"X4" 2082

## (undated) DEVICE — GLOVE PROTEXIS W/NEU-THERA 7.5  2D73TE75

## (undated) DEVICE — PEN MARKING SKIN W/LABELS 31145884

## (undated) DEVICE — ESU ELEC BLADE 2.75" COATED/INSULATED E1455

## (undated) DEVICE — ADH SKIN CLOSURE PREMIERPRO EXOFIN 1.0ML 3470

## (undated) DEVICE — GOWN XLG DISP 9545

## (undated) DEVICE — TUBING SUCTION 12"X1/4" N612

## (undated) DEVICE — SOL WATER IRRIG 1000ML BOTTLE 07139-09

## (undated) DEVICE — LIGHT HANDLE X2

## (undated) DEVICE — BLADE KNIFE SURG 15 371115

## (undated) RX ORDER — KETOROLAC TROMETHAMINE 30 MG/ML
INJECTION, SOLUTION INTRAMUSCULAR; INTRAVENOUS
Status: DISPENSED
Start: 2019-11-29

## (undated) RX ORDER — LIDOCAINE HYDROCHLORIDE AND EPINEPHRINE 10; 10 MG/ML; UG/ML
INJECTION, SOLUTION INFILTRATION; PERINEURAL
Status: DISPENSED
Start: 2019-11-29

## (undated) RX ORDER — DEXAMETHASONE SODIUM PHOSPHATE 4 MG/ML
INJECTION, SOLUTION INTRA-ARTICULAR; INTRALESIONAL; INTRAMUSCULAR; INTRAVENOUS; SOFT TISSUE
Status: DISPENSED
Start: 2019-11-29

## (undated) RX ORDER — FENTANYL CITRATE 50 UG/ML
INJECTION, SOLUTION INTRAMUSCULAR; INTRAVENOUS
Status: DISPENSED
Start: 2019-11-29

## (undated) RX ORDER — LIDOCAINE HYDROCHLORIDE 10 MG/ML
INJECTION, SOLUTION EPIDURAL; INFILTRATION; INTRACAUDAL; PERINEURAL
Status: DISPENSED
Start: 2019-11-29

## (undated) RX ORDER — ONDANSETRON 2 MG/ML
INJECTION INTRAMUSCULAR; INTRAVENOUS
Status: DISPENSED
Start: 2019-11-29

## (undated) RX ORDER — CEFAZOLIN SODIUM 2 G/100ML
INJECTION, SOLUTION INTRAVENOUS
Status: DISPENSED
Start: 2019-11-29

## (undated) RX ORDER — GINSENG 100 MG
CAPSULE ORAL
Status: DISPENSED
Start: 2019-11-29

## (undated) RX ORDER — SCOLOPAMINE TRANSDERMAL SYSTEM 1 MG/1
PATCH, EXTENDED RELEASE TRANSDERMAL
Status: DISPENSED
Start: 2019-11-29

## (undated) RX ORDER — ACETAMINOPHEN 325 MG/1
TABLET ORAL
Status: DISPENSED
Start: 2019-11-29

## (undated) RX ORDER — GABAPENTIN 300 MG/1
CAPSULE ORAL
Status: DISPENSED
Start: 2019-11-29